# Patient Record
Sex: MALE | Race: WHITE | Employment: OTHER | URBAN - METROPOLITAN AREA
[De-identification: names, ages, dates, MRNs, and addresses within clinical notes are randomized per-mention and may not be internally consistent; named-entity substitution may affect disease eponyms.]

---

## 2017-02-03 ENCOUNTER — APPOINTMENT (OUTPATIENT)
Dept: GENERAL RADIOLOGY | Facility: HOSPITAL | Age: 57
DRG: 247 | End: 2017-02-03
Attending: STUDENT IN AN ORGANIZED HEALTH CARE EDUCATION/TRAINING PROGRAM
Payer: COMMERCIAL

## 2017-02-03 ENCOUNTER — HOSPITAL ENCOUNTER (INPATIENT)
Facility: HOSPITAL | Age: 57
LOS: 3 days | Discharge: HOME | DRG: 247 | End: 2017-02-07
Attending: STUDENT IN AN ORGANIZED HEALTH CARE EDUCATION/TRAINING PROGRAM | Admitting: INTERNAL MEDICINE
Payer: COMMERCIAL

## 2017-02-03 DIAGNOSIS — R07.9 ACUTE CHEST PAIN: Primary | ICD-10-CM

## 2017-02-03 PROBLEM — R73.9 HYPERGLYCEMIA: Status: ACTIVE | Noted: 2017-02-03

## 2017-02-03 PROBLEM — E87.1 HYPONATREMIA: Status: ACTIVE | Noted: 2017-02-03

## 2017-02-03 LAB
ALBUMIN SERPL-MCNC: 4.2 G/DL (ref 3.5–4.8)
ALP LIVER SERPL-CCNC: 84 U/L (ref 45–117)
ALT SERPL-CCNC: 58 U/L (ref 17–63)
APTT PPP: 28.1 SECONDS (ref 25–34)
AST SERPL-CCNC: 28 U/L (ref 15–41)
BASOPHILS # BLD AUTO: 0.06 X10(3) UL (ref 0–0.1)
BASOPHILS NFR BLD AUTO: 0.6 %
BILIRUB SERPL-MCNC: 0.5 MG/DL (ref 0.1–2)
BUN BLD-MCNC: 15 MG/DL (ref 8–20)
CALCIUM BLD-MCNC: 9.1 MG/DL (ref 8.3–10.3)
CHLORIDE: 101 MMOL/L (ref 101–111)
CO2: 23 MMOL/L (ref 22–32)
CREAT BLD-MCNC: 1.05 MG/DL (ref 0.7–1.3)
EOSINOPHIL # BLD AUTO: 0.15 X10(3) UL (ref 0–0.3)
EOSINOPHIL NFR BLD AUTO: 1.5 %
ERYTHROCYTE [DISTWIDTH] IN BLOOD BY AUTOMATED COUNT: 12.5 % (ref 11.5–16)
GLUCOSE BLD-MCNC: 217 MG/DL (ref 70–99)
HCT VFR BLD AUTO: 46.3 % (ref 37–53)
HGB BLD-MCNC: 16.5 G/DL (ref 13–17)
IMMATURE GRANULOCYTE COUNT: 0.05 X10(3) UL (ref 0–1)
IMMATURE GRANULOCYTE RATIO %: 0.5 %
INR BLD: 1.06 (ref 0.89–1.12)
LYMPHOCYTES # BLD AUTO: 3.48 X10(3) UL (ref 0.9–4)
LYMPHOCYTES NFR BLD AUTO: 34.6 %
M PROTEIN MFR SERPL ELPH: 7.5 G/DL (ref 6.1–8.3)
MCH RBC QN AUTO: 32.4 PG (ref 27–33.2)
MCHC RBC AUTO-ENTMCNC: 35.6 G/DL (ref 31–37)
MCV RBC AUTO: 90.8 FL (ref 80–99)
MONOCYTES # BLD AUTO: 0.84 X10(3) UL (ref 0.1–0.6)
MONOCYTES NFR BLD AUTO: 8.3 %
NEUTROPHIL ABS PRELIM: 5.48 X10 (3) UL (ref 1.3–6.7)
NEUTROPHILS # BLD AUTO: 5.48 X10(3) UL (ref 1.3–6.7)
NEUTROPHILS NFR BLD AUTO: 54.5 %
PLATELET # BLD AUTO: 200 10(3)UL (ref 150–450)
POTASSIUM SERPL-SCNC: 3.9 MMOL/L (ref 3.6–5.1)
PRO-BETA NATRIURETIC PEPTIDE: 49 PG/ML (ref ?–125)
PSA SERPL DL<=0.01 NG/ML-MCNC: 14.1 SECONDS (ref 12.3–14.8)
RBC # BLD AUTO: 5.1 X10(6)UL (ref 4.3–5.7)
RED CELL DISTRIBUTION WIDTH-SD: 41.4 FL (ref 35.1–46.3)
SODIUM SERPL-SCNC: 134 MMOL/L (ref 136–144)
TROPONIN: <0.046 NG/ML (ref ?–0.05)
WBC # BLD AUTO: 10.1 X10(3) UL (ref 4–13)

## 2017-02-03 PROCEDURE — 71010 XR CHEST AP PORTABLE  (CPT=71010): CPT

## 2017-02-03 RX ORDER — HEPARIN SODIUM 5000 [USP'U]/ML
5000 INJECTION INTRAVENOUS; SUBCUTANEOUS ONCE
Status: COMPLETED | OUTPATIENT
Start: 2017-02-03 | End: 2017-02-04

## 2017-02-03 RX ORDER — HEPARIN SODIUM AND DEXTROSE 10000; 5 [USP'U]/100ML; G/100ML
1000 INJECTION INTRAVENOUS ONCE
Status: COMPLETED | OUTPATIENT
Start: 2017-02-03 | End: 2017-02-04

## 2017-02-03 RX ORDER — NITROGLYCERIN 0.4 MG/1
TABLET SUBLINGUAL
Status: COMPLETED
Start: 2017-02-03 | End: 2017-02-03

## 2017-02-03 RX ORDER — NITROGLYCERIN 0.4 MG/1
0.4 TABLET SUBLINGUAL EVERY 5 MIN PRN
Status: DISCONTINUED | OUTPATIENT
Start: 2017-02-03 | End: 2017-02-04

## 2017-02-04 ENCOUNTER — APPOINTMENT (OUTPATIENT)
Dept: CV DIAGNOSTICS | Facility: HOSPITAL | Age: 57
DRG: 247 | End: 2017-02-04
Attending: INTERNAL MEDICINE
Payer: COMMERCIAL

## 2017-02-04 ENCOUNTER — APPOINTMENT (OUTPATIENT)
Dept: CT IMAGING | Facility: HOSPITAL | Age: 57
DRG: 247 | End: 2017-02-04
Attending: STUDENT IN AN ORGANIZED HEALTH CARE EDUCATION/TRAINING PROGRAM
Payer: COMMERCIAL

## 2017-02-04 PROBLEM — I20.0 UNSTABLE ANGINA (HCC): Status: ACTIVE | Noted: 2017-02-04

## 2017-02-04 LAB
APTT PPP: 56.1 SECONDS (ref 25–34)
ATRIAL RATE: 78 BPM
BASOPHILS # BLD AUTO: 0.05 X10(3) UL (ref 0–0.1)
BASOPHILS NFR BLD AUTO: 0.4 %
BUN BLD-MCNC: 15 MG/DL (ref 8–20)
CALCIUM BLD-MCNC: 8.3 MG/DL (ref 8.3–10.3)
CHLORIDE: 104 MMOL/L (ref 101–111)
CHOLEST SMN-MCNC: 132 MG/DL (ref ?–200)
CO2: 27 MMOL/L (ref 22–32)
CREAT BLD-MCNC: 1.05 MG/DL (ref 0.7–1.3)
EOSINOPHIL # BLD AUTO: 0.12 X10(3) UL (ref 0–0.3)
EOSINOPHIL NFR BLD AUTO: 1 %
ERYTHROCYTE [DISTWIDTH] IN BLOOD BY AUTOMATED COUNT: 12.6 % (ref 11.5–16)
EST. AVERAGE GLUCOSE BLD GHB EST-MCNC: 220 MG/DL (ref 68–126)
GLUCOSE BLD-MCNC: 153 MG/DL (ref 65–99)
GLUCOSE BLD-MCNC: 154 MG/DL (ref 65–99)
GLUCOSE BLD-MCNC: 198 MG/DL (ref 65–99)
GLUCOSE BLD-MCNC: 206 MG/DL (ref 65–99)
GLUCOSE BLD-MCNC: 218 MG/DL (ref 70–99)
HAV IGM SER QL: 2.2 MG/DL (ref 1.7–3)
HBA1C MFR BLD HPLC: 9.3 % (ref ?–5.7)
HCT VFR BLD AUTO: 42 % (ref 37–53)
HDLC SERPL-MCNC: 42 MG/DL (ref 45–?)
HDLC SERPL: 3.14 {RATIO} (ref ?–4.97)
HGB BLD-MCNC: 14.8 G/DL (ref 13–17)
IMMATURE GRANULOCYTE COUNT: 0.05 X10(3) UL (ref 0–1)
IMMATURE GRANULOCYTE RATIO %: 0.4 %
INR BLD: 1.12 (ref 0.89–1.12)
LDLC SERPL CALC-MCNC: 65 MG/DL (ref ?–130)
LYMPHOCYTES # BLD AUTO: 2.3 X10(3) UL (ref 0.9–4)
LYMPHOCYTES NFR BLD AUTO: 19.7 %
MCH RBC QN AUTO: 32.3 PG (ref 27–33.2)
MCHC RBC AUTO-ENTMCNC: 35.2 G/DL (ref 31–37)
MCV RBC AUTO: 91.7 FL (ref 80–99)
MONOCYTES # BLD AUTO: 0.83 X10(3) UL (ref 0.1–0.6)
MONOCYTES NFR BLD AUTO: 7.1 %
NEUTROPHIL ABS PRELIM: 8.3 X10 (3) UL (ref 1.3–6.7)
NEUTROPHILS # BLD AUTO: 8.3 X10(3) UL (ref 1.3–6.7)
NEUTROPHILS NFR BLD AUTO: 71.4 %
NONHDLC SERPL-MCNC: 90 MG/DL (ref ?–130)
P AXIS: 60 DEGREES
P-R INTERVAL: 154 MS
PLATELET # BLD AUTO: 158 10(3)UL (ref 150–450)
POTASSIUM SERPL-SCNC: 3.9 MMOL/L (ref 3.6–5.1)
PSA SERPL DL<=0.01 NG/ML-MCNC: 14.8 SECONDS (ref 12.3–14.8)
Q-T INTERVAL: 396 MS
QRS DURATION: 92 MS
QTC CALCULATION (BEZET): 451 MS
R AXIS: 24 DEGREES
RBC # BLD AUTO: 4.58 X10(6)UL (ref 4.3–5.7)
RED CELL DISTRIBUTION WIDTH-SD: 41.9 FL (ref 35.1–46.3)
SODIUM SERPL-SCNC: 137 MMOL/L (ref 136–144)
T AXIS: 79 DEGREES
TRIGLYCERIDES: 127 MG/DL (ref ?–150)
TROPONIN: <0.046 NG/ML (ref ?–0.05)
VENTRICULAR RATE: 78 BPM
VLDL: 25 MG/DL (ref 5–40)
WBC # BLD AUTO: 11.7 X10(3) UL (ref 4–13)

## 2017-02-04 PROCEDURE — 93306 TTE W/DOPPLER COMPLETE: CPT

## 2017-02-04 PROCEDURE — 93306 TTE W/DOPPLER COMPLETE: CPT | Performed by: INTERNAL MEDICINE

## 2017-02-04 PROCEDURE — 99220 INITIAL OBSERVATION CARE,LEVL III: CPT | Performed by: INTERNAL MEDICINE

## 2017-02-04 PROCEDURE — 71275 CT ANGIOGRAPHY CHEST: CPT

## 2017-02-04 RX ORDER — WARFARIN SODIUM 5 MG/1
5 TABLET ORAL NIGHTLY
Status: DISCONTINUED | OUTPATIENT
Start: 2017-02-04 | End: 2017-02-04

## 2017-02-04 RX ORDER — MORPHINE SULFATE 4 MG/ML
4 INJECTION, SOLUTION INTRAMUSCULAR; INTRAVENOUS EVERY 2 HOUR PRN
Status: DISCONTINUED | OUTPATIENT
Start: 2017-02-04 | End: 2017-02-07

## 2017-02-04 RX ORDER — ASPIRIN 325 MG
325 TABLET ORAL DAILY
Status: DISCONTINUED | OUTPATIENT
Start: 2017-02-04 | End: 2017-02-07

## 2017-02-04 RX ORDER — SERTRALINE HYDROCHLORIDE 100 MG/1
100 TABLET, FILM COATED ORAL DAILY
Status: DISCONTINUED | OUTPATIENT
Start: 2017-02-04 | End: 2017-02-07

## 2017-02-04 RX ORDER — HEPARIN SODIUM AND DEXTROSE 10000; 5 [USP'U]/100ML; G/100ML
INJECTION INTRAVENOUS CONTINUOUS
Status: DISCONTINUED | OUTPATIENT
Start: 2017-02-05 | End: 2017-02-04

## 2017-02-04 RX ORDER — DEXTROSE MONOHYDRATE 25 G/50ML
50 INJECTION, SOLUTION INTRAVENOUS
Status: DISCONTINUED | OUTPATIENT
Start: 2017-02-04 | End: 2017-02-07

## 2017-02-04 RX ORDER — NITROGLYCERIN 0.4 MG/1
0.4 TABLET SUBLINGUAL EVERY 5 MIN PRN
Status: DISCONTINUED | OUTPATIENT
Start: 2017-02-04 | End: 2017-02-07

## 2017-02-04 RX ORDER — METOPROLOL SUCCINATE 25 MG/1
25 TABLET, EXTENDED RELEASE ORAL
Status: DISCONTINUED | OUTPATIENT
Start: 2017-02-04 | End: 2017-02-07

## 2017-02-04 RX ORDER — MORPHINE SULFATE 2 MG/ML
2 INJECTION, SOLUTION INTRAMUSCULAR; INTRAVENOUS EVERY 2 HOUR PRN
Status: DISCONTINUED | OUTPATIENT
Start: 2017-02-04 | End: 2017-02-07

## 2017-02-04 RX ORDER — ASPIRIN 81 MG/1
81 TABLET, CHEWABLE ORAL DAILY
Status: DISCONTINUED | OUTPATIENT
Start: 2017-02-04 | End: 2017-02-04

## 2017-02-04 RX ORDER — HEPARIN SODIUM AND DEXTROSE 10000; 5 [USP'U]/100ML; G/100ML
1000 INJECTION INTRAVENOUS CONTINUOUS
Status: DISCONTINUED | OUTPATIENT
Start: 2017-02-04 | End: 2017-02-05

## 2017-02-04 RX ORDER — ATORVASTATIN CALCIUM 40 MG/1
40 TABLET, FILM COATED ORAL NIGHTLY
Status: DISCONTINUED | OUTPATIENT
Start: 2017-02-04 | End: 2017-02-07

## 2017-02-04 RX ORDER — MORPHINE SULFATE 2 MG/ML
1 INJECTION, SOLUTION INTRAMUSCULAR; INTRAVENOUS EVERY 2 HOUR PRN
Status: DISCONTINUED | OUTPATIENT
Start: 2017-02-04 | End: 2017-02-07

## 2017-02-04 RX ORDER — HEPARIN SODIUM AND DEXTROSE 10000; 5 [USP'U]/100ML; G/100ML
1000 INJECTION INTRAVENOUS CONTINUOUS
Status: DISCONTINUED | OUTPATIENT
Start: 2017-02-04 | End: 2017-02-04

## 2017-02-04 NOTE — PROGRESS NOTES
Received patient from ED 0100, alert and oriented X 4, spouse at bedside, no c/o CP/SOB, SpO2 97 % on 4L/min via NC  Rhythm/HR: NSR 70's  Heparin gtt initiated in ED at 1000 units/hr (10ml/hr) around 0000  0600- PTT 56.1- therapeutic, lab to be drawn in th

## 2017-02-04 NOTE — ED PROVIDER NOTES
Patient Seen in: [facility] Emergency Department    History   Patient presents with:  Chest Pain Angina (cardiovascular)    Stated Complaint: chest pain    HPI    Patient is a 43-year-old male who presents emergency department complaining of tightne op shoe    Dx: toe fracture x 3 right foot.        Family History   Problem Relation Age of Onset   • Heart Disease Father    • Heart Disorder Father    • Heart Disease Sister    • Cancer Sister    • Stroke Sister    • Heart Disorder Sister    • Diabetes Si Normal range of motion, no edema. Neurological: alert and oriented to person, place, and time. Skin: Skin is warm and dry. No rash noted. Psychiatric: normal mood and affect.        ED Course     Labs Reviewed   COMP METABOLIC PANEL (14) - Abnormal; N Patient's pain resolved with nitroglycerin and did not recur in the ER. Interim Nitropaste was applied to the patient's chest wall.     Patient remained hemodynamically stable throughout their emergency department period of observation with no adverse

## 2017-02-04 NOTE — PAYOR COMM NOTE
Attending Physician: Neo Kovacs MD    Review Type: ADMISSION   Brenna Lieberman       Date: February 4, 2017 - 2:18 PM  [id number]: N[facility]A  Admit date: 2/3/2017 10:10 PM   Admitted from Emergency Dept. Oral Renny Prasad RN      Heparin Sodium (Porcine) 5000 UNIT/ML injection 5,000 Units     Date Action Dose Route User    2/4/2017 0010 Given 5000 Units Intravenous Jacobo Walsh RN      heparin (PORCINE) drip 34145woqsf/250mL infusion CONTINUOUS Notable for the following:     POC Glucose 154 (*)     All other components within normal limits   POCT GLUCOSE - Abnormal; Notable for the following:     POC Glucose 153 (*)     All other components within normal limits   CBC W/ DIFFERENTIAL - Abnormal; N

## 2017-02-04 NOTE — H&P
[facility]                                                               History & Physical         Deric Vail Patient Status:  Emergency    1960 MRN [id number]   Location [address] Attending Nahomi Christine Disease Brother    • Stroke Brother    • Heart Disorder Brother    • Heart Disorder Mother       Reviewed    Social history:   reports that he has been smoking. He does not have any smokeless tobacco history on file. He reports that he drinks alcohol.  He <0.046 02/03/2017       Recent Labs   Lab  02/03/17   2216   PTP  14.1   INR  1.06       Recent Labs   Lab  02/03/17   2216   TROP  <0.046     Imaging:    CT ANGIOGRAM OF THE CHEST      COMPARISON:  [place] CTA CHEST, 8/10/2012, 14:27.  [place] , BRAIN W/O steatosis. BONES:  Mild degenerative disc disease.      =====  CONCLUSION:     #1. No acute pulmonary embolus to the segmental level. #2. Emphysematous changes. #3. Pulmonary nodules described above.  The majority of these nodules are stable since 8/10/1 according to patient      Quality:  · DVT Prophylaxis: Patient on heparin drip  · CODE status: Full  · Acosta: None    Plan of care discussed with patient and patient's wife at bedside      Discussed with ER Physician.   Discussed in detail with patient and

## 2017-02-04 NOTE — ED INITIAL ASSESSMENT (HPI)
For the past hour, pt has complained of a tightness to the center of his chest. He has one cardiac stent placed. Medics gave him 4 baby asa and 2 nitro sprays.

## 2017-02-04 NOTE — ED PROVIDER NOTES
Radiology called with CT report of nodule is slightly increased in size and the patient should have a repeat CT scan in 6 months to rule out any concerning change in size for this nodule.   I did relay this information to the primary nurse on the floor wher

## 2017-02-04 NOTE — PROGRESS NOTES
02/04/17 0145 02/04/17 0148 02/04/17 0151   Vital Signs   Respiratory Quality Normal Normal Normal   /70 mmHg 123/64 mmHg 113/83 mmHg   BP Location Left arm Left arm Left arm   BP Method Automatic Automatic Automatic   Patient Position Lying Sitti

## 2017-02-04 NOTE — CONSULTS
[facility] Cardiology Consultation    Jyotsna Schmidt Patient Status:  Observation    1960 MRN [id number]   [facility] 2NE-A Attending Gloria Ocampo MD   Hosp Day # 1 PCP Juwan Fragoso MD     Reason for Consultation:  Chest pain, known Disorder Mother          Allergies:    Penicillins                 Comment:Was told as a child that is allergic to Penicillin    Medications:  • aspirin  325 mg Oral Daily   • insulin aspart  1-10 Units Subcutaneous TID CC   • Metoprolol Succinate ER  25 m Recent Labs   Lab  02/03/17   2216  02/04/17   0731   PTP  14.1  14.8   INR  1.06  1.12       Recent Labs   Lab  02/03/17 2216  02/04/17   0453   TROP  <0.046  <0.046         Impression:   1.  Chest pain, dyspnea, palpitations - pain resolved with N

## 2017-02-05 LAB
APTT PPP: 42.5 SECONDS (ref 25–34)
GLUCOSE BLD-MCNC: 138 MG/DL (ref 65–99)
GLUCOSE BLD-MCNC: 146 MG/DL (ref 65–99)
GLUCOSE BLD-MCNC: 147 MG/DL (ref 65–99)
GLUCOSE BLD-MCNC: 183 MG/DL (ref 65–99)
TROPONIN: <0.046 NG/ML (ref ?–0.05)

## 2017-02-05 PROCEDURE — 99225 SUBSEQUENT OBSERVATION CARE: CPT | Performed by: HOSPITALIST

## 2017-02-05 RX ORDER — SODIUM CHLORIDE 9 MG/ML
INJECTION, SOLUTION INTRAVENOUS CONTINUOUS
Status: DISCONTINUED | OUTPATIENT
Start: 2017-02-06 | End: 2017-02-06

## 2017-02-05 RX ORDER — ASPIRIN 81 MG/1
324 TABLET, CHEWABLE ORAL ONCE
Status: COMPLETED | OUTPATIENT
Start: 2017-02-06 | End: 2017-02-06

## 2017-02-05 RX ORDER — ENOXAPARIN SODIUM 150 MG/ML
1 INJECTION SUBCUTANEOUS ONCE
Status: COMPLETED | OUTPATIENT
Start: 2017-02-05 | End: 2017-02-05

## 2017-02-05 RX ORDER — SODIUM CHLORIDE 9 MG/ML
INJECTION, SOLUTION INTRAVENOUS CONTINUOUS
Status: DISCONTINUED | OUTPATIENT
Start: 2017-02-05 | End: 2017-02-05

## 2017-02-05 NOTE — PLAN OF CARE
Diabetes/Glucose Control    • Glucose maintained within prescribed range Progressing        PAIN - ADULT    • Verbalizes/displays adequate comfort level or patient's stated pain goal Progressing        Patient/Family Goals    • Patient/Family [facility]

## 2017-02-05 NOTE — PROGRESS NOTES
[facility]  Progress Note     Tiffany Perez Patient Status:  Observation    1960 MRN [id number]   [facility] 2NE-A Attending Kingsley Lewis MD   Hosp Day # 2 PCP Lise Lerma MD     Chief Complaint: Chest pain    S: Wolm Mantis Sertraline HCl  100 mg Oral Daily       ASSESSMENT / PLAN:     1. Chest pain, troponin negative  2. History of CAD sp PCI  3. Essential hypertension  4. Dyslipidemia, LDL goal < 70  1. Aspirin  2. Toprol  3. Lipitor  4. Heparin drip > Lovenox x 1 today  5.

## 2017-02-05 NOTE — PROGRESS NOTES
[facility] Cardiology Progress Note        Daryle Alamo Patient Status:  Observation    1960 MRN [id number]   [facility] 2NE-A Attending Bee López MD   Hosp Day # 2 PCP Katie Allison MD     Subjective 02/03/17 2216   ALT  58   AST  28   ALB  4.2       Recent Labs   Lab  02/03/17 2216 02/04/17   0453  02/05/17   0555   TROP  <0.046  <0.046  <0.046       Recent Labs   Lab  02/03/17 2216 02/04/17   0731   PTP  14.1  14.8   INR  1.06  1.12

## 2017-02-05 NOTE — PLAN OF CARE
Problem: Diabetes/Glucose Control  Goal: Glucose maintained within prescribed range  INTERVENTIONS:  - Monitor Blood Glucose as ordered  - Assess for signs and symptoms of hyperglycemia and hypoglycemia  - Administer ordered medications to maintain glucose injury  INTERVENTIONS:  - Assess pt frequently for physical needs  - Identify cognitive and physical deficits and behaviors that affect risk of falls.   - [place] fall precautions as indicated by assessment.  - Educate pt/family on patient safety includin

## 2017-02-06 ENCOUNTER — APPOINTMENT (OUTPATIENT)
Dept: INTERVENTIONAL RADIOLOGY/VASCULAR | Facility: HOSPITAL | Age: 57
DRG: 247 | End: 2017-02-06
Attending: INTERNAL MEDICINE
Payer: COMMERCIAL

## 2017-02-06 LAB
APTT PPP: 32.1 SECONDS (ref 25–34)
BASOPHILS # BLD AUTO: 0.04 X10(3) UL (ref 0–0.1)
BASOPHILS NFR BLD AUTO: 0.5 %
BUN BLD-MCNC: 19 MG/DL (ref 8–20)
CALCIUM BLD-MCNC: 8.5 MG/DL (ref 8.3–10.3)
CHLORIDE: 108 MMOL/L (ref 101–111)
CO2: 24 MMOL/L (ref 22–32)
CREAT BLD-MCNC: 0.83 MG/DL (ref 0.7–1.3)
EOSINOPHIL # BLD AUTO: 0.15 X10(3) UL (ref 0–0.3)
EOSINOPHIL NFR BLD AUTO: 1.9 %
ERYTHROCYTE [DISTWIDTH] IN BLOOD BY AUTOMATED COUNT: 12.4 % (ref 11.5–16)
GLUCOSE BLD-MCNC: 123 MG/DL (ref 65–99)
GLUCOSE BLD-MCNC: 137 MG/DL (ref 65–99)
GLUCOSE BLD-MCNC: 140 MG/DL (ref 65–99)
GLUCOSE BLD-MCNC: 154 MG/DL (ref 70–99)
GLUCOSE BLD-MCNC: 242 MG/DL (ref 65–99)
HCT VFR BLD AUTO: 46.4 % (ref 37–53)
HGB BLD-MCNC: 16.1 G/DL (ref 13–17)
IMMATURE GRANULOCYTE COUNT: 0.07 X10(3) UL (ref 0–1)
IMMATURE GRANULOCYTE RATIO %: 0.9 %
INR BLD: 1.01 (ref 0.89–1.12)
LYMPHOCYTES # BLD AUTO: 2.38 X10(3) UL (ref 0.9–4)
LYMPHOCYTES NFR BLD AUTO: 30.6 %
MCH RBC QN AUTO: 32 PG (ref 27–33.2)
MCHC RBC AUTO-ENTMCNC: 34.7 G/DL (ref 31–37)
MCV RBC AUTO: 92.2 FL (ref 80–99)
MONOCYTES # BLD AUTO: 0.72 X10(3) UL (ref 0.1–0.6)
MONOCYTES NFR BLD AUTO: 9.3 %
NEUTROPHIL ABS PRELIM: 4.42 X10 (3) UL (ref 1.3–6.7)
NEUTROPHILS # BLD AUTO: 4.42 X10(3) UL (ref 1.3–6.7)
NEUTROPHILS NFR BLD AUTO: 56.8 %
PLATELET # BLD AUTO: 153 10(3)UL (ref 150–450)
POTASSIUM SERPL-SCNC: 4.2 MMOL/L (ref 3.6–5.1)
PSA SERPL DL<=0.01 NG/ML-MCNC: 13.6 SECONDS (ref 12.3–14.8)
RBC # BLD AUTO: 5.03 X10(6)UL (ref 4.3–5.7)
RED CELL DISTRIBUTION WIDTH-SD: 42 FL (ref 35.1–46.3)
SODIUM SERPL-SCNC: 140 MMOL/L (ref 136–144)
WBC # BLD AUTO: 7.8 X10(3) UL (ref 4–13)

## 2017-02-06 PROCEDURE — B2151ZZ FLUOROSCOPY OF LEFT HEART USING LOW OSMOLAR CONTRAST: ICD-10-PCS | Performed by: INTERNAL MEDICINE

## 2017-02-06 PROCEDURE — 4A023N7 MEASUREMENT OF CARDIAC SAMPLING AND PRESSURE, LEFT HEART, PERCUTANEOUS APPROACH: ICD-10-PCS | Performed by: INTERNAL MEDICINE

## 2017-02-06 PROCEDURE — 027035Z DILATION OF CORONARY ARTERY, ONE ARTERY WITH TWO DRUG-ELUTING INTRALUMINAL DEVICES, PERCUTANEOUS APPROACH: ICD-10-PCS | Performed by: INTERNAL MEDICINE

## 2017-02-06 PROCEDURE — B2111ZZ FLUOROSCOPY OF MULTIPLE CORONARY ARTERIES USING LOW OSMOLAR CONTRAST: ICD-10-PCS | Performed by: INTERNAL MEDICINE

## 2017-02-06 RX ORDER — HEPARIN SODIUM 5000 [USP'U]/ML
INJECTION, SOLUTION INTRAVENOUS; SUBCUTANEOUS
Status: COMPLETED
Start: 2017-02-06 | End: 2017-02-06

## 2017-02-06 RX ORDER — MIDAZOLAM HYDROCHLORIDE 1 MG/ML
INJECTION INTRAMUSCULAR; INTRAVENOUS
Status: COMPLETED
Start: 2017-02-06 | End: 2017-02-06

## 2017-02-06 RX ORDER — LIDOCAINE HYDROCHLORIDE 10 MG/ML
INJECTION, SOLUTION INFILTRATION; PERINEURAL
Status: COMPLETED
Start: 2017-02-06 | End: 2017-02-06

## 2017-02-06 RX ORDER — PRASUGREL 10 MG/1
TABLET, FILM COATED ORAL
Status: COMPLETED
Start: 2017-02-06 | End: 2017-02-06

## 2017-02-06 NOTE — BRIEF PROCEDURE NOTE
Interventional cardiology procedure note:    80% and 70% stenosis in the circumflex system distal stenosis is probably in-stent restenosis    PTCA of both lesions with 2.5 x 15 balloon  Stenting of both lesions with 3.0 x 16 Promus drug-eluting stents    F

## 2017-02-06 NOTE — PROCEDURES
[address]    PATIENT'S NAME: Nathaniel De Leonronaldo SHAFFER   ATTENDING PHYSICIAN: Gurpreet Scott M.D. OPERATING PHYSICIAN: Phyllis Portillo M.D.    PATIENT ACCOUNT#:   [de-identified]    LOCATION:  [address] [place] [address]  MEDICAL RECORD #:   [id number]       DATE OF BI guidewire and positioned over the more proximal lesion. The stent was also deployed at 14 atmospheres for about 35 seconds with good balloon expansion. Post stent deployment angiography now showed essentially 0% residual stenosis.   There was some concern

## 2017-02-06 NOTE — PLAN OF CARE
Patient to go for cath tomorrow. Denies pain at this time. Pre and post op procedures for angiogram discussed with patient. Understanding expressed.

## 2017-02-06 NOTE — PLAN OF CARE
Pt admit w/ CP, to have cath today. Pt A&O, denies cp. VSS. On RA. NSR. Had cath through R fem w/ Dr. Derrick Hanks- 2 DAVID to circ. Pt ambulates ad aldo w/o complication. Pt and wife updated on POC- possible d/c tomorrow. Will monitor.     1700: Pt recove

## 2017-02-06 NOTE — PROCEDURES
[facility]  Coronary Angiogram Procedure Note    [facility] Location: Cath Lab    CSN [id number] MRN [id number]   Admission Date 2/3/2017 Procedure Date 2/6/2017   Attending Physician Dalila Ahumada, MD Procedure Physician Mk Bello MD Findings:    Diffuse coronary calcfications    1. Left Main: 20% distal tapering  2. LAD: one major diagonal with proximal takeoff. Diffuse narrowing with discrete 50% stenosis in the mid portion, otherwise no significant obstructive disease.   3. LCx: 2 v

## 2017-02-07 VITALS
TEMPERATURE: 98 F | SYSTOLIC BLOOD PRESSURE: 130 MMHG | HEIGHT: 70 IN | DIASTOLIC BLOOD PRESSURE: 92 MMHG | BODY MASS INDEX: 34.18 KG/M2 | WEIGHT: 238.75 LBS | OXYGEN SATURATION: 94 % | RESPIRATION RATE: 18 BRPM | HEART RATE: 65 BPM

## 2017-02-07 LAB
ATRIAL RATE: 56 BPM
BASOPHILS # BLD AUTO: 0.03 X10(3) UL (ref 0–0.1)
BASOPHILS NFR BLD AUTO: 0.3 %
BUN BLD-MCNC: 14 MG/DL (ref 8–20)
CALCIUM BLD-MCNC: 8.8 MG/DL (ref 8.3–10.3)
CHLORIDE: 106 MMOL/L (ref 101–111)
CO2: 28 MMOL/L (ref 22–32)
CREAT BLD-MCNC: 1 MG/DL (ref 0.7–1.3)
EOSINOPHIL # BLD AUTO: 0.12 X10(3) UL (ref 0–0.3)
EOSINOPHIL NFR BLD AUTO: 1.4 %
ERYTHROCYTE [DISTWIDTH] IN BLOOD BY AUTOMATED COUNT: 12.6 % (ref 11.5–16)
GLUCOSE BLD-MCNC: 148 MG/DL (ref 65–99)
GLUCOSE BLD-MCNC: 152 MG/DL (ref 65–99)
GLUCOSE BLD-MCNC: 155 MG/DL (ref 70–99)
HCT VFR BLD AUTO: 45.5 % (ref 37–53)
HGB BLD-MCNC: 15.8 G/DL (ref 13–17)
IMMATURE GRANULOCYTE COUNT: 0.09 X10(3) UL (ref 0–1)
IMMATURE GRANULOCYTE RATIO %: 1 %
LYMPHOCYTES # BLD AUTO: 1.84 X10(3) UL (ref 0.9–4)
LYMPHOCYTES NFR BLD AUTO: 21 %
MCH RBC QN AUTO: 31.9 PG (ref 27–33.2)
MCHC RBC AUTO-ENTMCNC: 34.7 G/DL (ref 31–37)
MCV RBC AUTO: 91.9 FL (ref 80–99)
MONOCYTES # BLD AUTO: 0.85 X10(3) UL (ref 0.1–0.6)
MONOCYTES NFR BLD AUTO: 9.7 %
NEUTROPHIL ABS PRELIM: 5.85 X10 (3) UL (ref 1.3–6.7)
NEUTROPHILS # BLD AUTO: 5.85 X10(3) UL (ref 1.3–6.7)
NEUTROPHILS NFR BLD AUTO: 66.6 %
P AXIS: 18 DEGREES
P-R INTERVAL: 158 MS
PLATELET # BLD AUTO: 148 10(3)UL (ref 150–450)
POTASSIUM SERPL-SCNC: 4.2 MMOL/L (ref 3.6–5.1)
Q-T INTERVAL: 470 MS
QRS DURATION: 96 MS
QTC CALCULATION (BEZET): 453 MS
R AXIS: 27 DEGREES
RBC # BLD AUTO: 4.95 X10(6)UL (ref 4.3–5.7)
RED CELL DISTRIBUTION WIDTH-SD: 42.3 FL (ref 35.1–46.3)
SODIUM SERPL-SCNC: 140 MMOL/L (ref 136–144)
T AXIS: 49 DEGREES
VENTRICULAR RATE: 56 BPM
WBC # BLD AUTO: 8.8 X10(3) UL (ref 4–13)

## 2017-02-07 PROCEDURE — 99239 HOSP IP/OBS DSCHRG MGMT >30: CPT | Performed by: HOSPITALIST

## 2017-02-07 RX ORDER — PRASUGREL 10 MG/1
10 TABLET, FILM COATED ORAL DAILY
Qty: 30 TABLET | Refills: 11 | Status: SHIPPED | OUTPATIENT
Start: 2017-02-07 | End: 2021-07-19

## 2017-02-07 RX ORDER — PRASUGREL 10 MG/1
10 TABLET, FILM COATED ORAL DAILY
Status: DISCONTINUED | OUTPATIENT
Start: 2017-02-07 | End: 2017-02-07

## 2017-02-07 RX ORDER — ASPIRIN 325 MG
325 TABLET ORAL DAILY
Qty: 90 TABLET | Refills: 3 | Status: SHIPPED | OUTPATIENT
Start: 2017-02-07 | End: 2021-07-19

## 2017-02-07 RX ORDER — METOPROLOL SUCCINATE 25 MG/1
25 TABLET, EXTENDED RELEASE ORAL
Qty: 30 TABLET | Refills: 3 | Status: SHIPPED | OUTPATIENT
Start: 2017-02-07

## 2017-02-07 NOTE — PROGRESS NOTES
[facility] Cardiology Progress Note        Kristi Garcia Patient Status:  Observation    1960 MRN [id number]   [facility] 2NE-A Attending Jc White MD   Hosp Day # 4 PCP Santos Adhikari MD     Subjective ALT, AST, ALB, AMYLASE, LIPASE, LDH in the last 72 hours. Invalid input(s): ALPHOS, TBIL, DBIL, TPROT    Recent Labs   Lab  02/05/17   0555   TROP  <0.046       Recent Labs   Lab  02/06/17   0558   PTP  13.6   INR  1.01                 Impression:  1.  C

## 2017-02-07 NOTE — CONSULTS
Hematology/Oncology Consult    Cc: PE, APLA    HPI:  47-year-old man was last seen in my office in 12/12 for PE's diagnosed in 8/12. He presented to the ED in 8/12 with right-sided pleuritic chest pain.   CT chest on 8/10/12 showed right-sided thrombus inv significant inferior wall MI with circumflex occlusion and a drug eluting stent placed   • Long term current use of anticoagulant    • Pulmonary embolism (HCC)    • Coronary artery disease    • Hyperlipidemia    • Hypercholesterolemia    • Diabetes mellitu Special: Cannabis       Comment: last use tonight, uses daily      Family History   Problem Relation Age of Onset   • Heart Disease Father    • Heart Disorder Father    • Heart Disease Sister    • Cancer Sister    • Stroke Sister    • Heart Diso However, prasugrel and aspirin are antiplatelet agents that protect against arterial thromboses, whereas coumadin and like medications protect mainly against venous thromboses. The clotting mechanisms are slightly different.   If his APLA labs are still ab

## 2017-02-07 NOTE — PROGRESS NOTES
[facility]  Progress Note     [facility] Patient Status:  Observation    1960 MRN [id number]   [facility] 2NE-A Attending Elisa Anthony MD   Hosp Day # 4 PCP Meggan Mauro MD     Chief Complaint: Chest pain    S: Jonatan Roberts to start 2/9  2. Accuechecks  6. History of PE - last noted to be on Coumadin in 2015 - no records since, VTE in 2012, isolated, no recurrence  1. Doubt needs anticoagulation, hematology to weigh in  7. Lung nodules, discussed with patient  1.  Outpatient f

## 2017-02-07 NOTE — PROGRESS NOTES
NURSING DISCHARGE NOTE      Pt discharged to home w/ wife in stable condition, cleared by all consults. Cath and diabetes discharge instructions provided and reviewed w/ pt.   Prescriptions, medication list, and follow-up appointments provided and re

## 2017-02-07 NOTE — PLAN OF CARE
Pt s/p cath yesterday w/ 2 DAVID to circumflex. Pt A&O, denies pain. Wants to go home. VSS. On RA.  NSR/SB. Effient started today. R groin cath site C&D, soft, no hematoma noted. Pt educated on DM and smoking cessation- verbalized understanding.   Pt an

## 2017-02-07 NOTE — PLAN OF CARE
A&ox4, s/p LHC w/ 2 DAVID to circ  R groin angioseal, c/d/i, dressing in place, soft, no hematoma  Room air, NSR on tele  Voids  Up ad aldo  Plan to d/c in am, with effient  Will continue to monitor overnight    CARDIOVASCULAR - ADULT    • Maintains optimal c

## 2017-02-07 NOTE — PAYOR COMM NOTE
Attending Physician: Prudencio Buerger, MD    2/6      Pre-Procedure Diagnosis: unstable angina    Post-Procedure Diagnosis: High grade single vessel CAD    Procedure Performed: CA, LV, LHC, angioseal    Summary of Case: After written informed consent was obt

## 2017-02-08 NOTE — DISCHARGE SUMMARY
[facility] HOSPITALIST  DISCHARGE SUMMARY     Albert Apodaca Patient Status:  Inpatient    1960 MRN [id number]   [facility] 2NE-A Attending Gopi Sommers MD   [facility] Day # 4 PCP Enedina Stallworth MD     Date of Admission: 2/3/2017  Date o with lung nodules, most stable and benign but one with change. Patient requires CT in 6 months. Lastly, patient with history of PE with APLA. Patient to follow-up with hematologist as outpatient to evaluate need for anticoagulation. Home today.     1. CAD s 2/6/2017 10:00 PM   Commonly known as:  LIPITOR        TAKE ONE TABLET BY MOUTH AT BEDTIME    Quantity:  90 tablet   Refills:  1       NON FORMULARY        Post op shoe  Dx: toe fracture x 3 right foot.     Quantity:  1 Device   Refills:  0       Sertraline

## 2017-02-09 LAB — ISTAT ACTIVATED CLOTTING TIME: 657 SECONDS (ref 74–137)

## 2017-02-10 LAB
BETA 2 GLYCOPROTEIN 1 AB, IGG: <3.7 U/ML (ref ?–15)
BETA 2 GLYCOPROTEIN 1 AB, IGM: 102.6 U/ML (ref ?–15)
DRVVT LUPUS ANTICOAGULANT: NEGATIVE
DRVVT SCREEN RATIO: 1 (ref 0–1.29)
PHOSPHOLIPID AB, IGG: NEGATIVE
STACLOT LA DELTA: 10 SECONDS (ref ?–8)
STACLOT LA: POSITIVE

## 2017-02-28 ENCOUNTER — PRIOR ORIGINAL RECORDS (OUTPATIENT)
Dept: OTHER | Age: 57
End: 2017-02-28

## 2017-03-28 ENCOUNTER — PRIOR ORIGINAL RECORDS (OUTPATIENT)
Dept: OTHER | Age: 57
End: 2017-03-28

## 2017-04-06 ENCOUNTER — PRIOR ORIGINAL RECORDS (OUTPATIENT)
Dept: OTHER | Age: 57
End: 2017-04-06

## 2017-04-17 ENCOUNTER — PRIOR ORIGINAL RECORDS (OUTPATIENT)
Dept: OTHER | Age: 57
End: 2017-04-17

## 2017-07-15 ENCOUNTER — HOSPITAL ENCOUNTER (EMERGENCY)
Facility: HOSPITAL | Age: 57
Discharge: HOME | End: 2017-07-15
Attending: EMERGENCY MEDICINE
Payer: COMMERCIAL

## 2017-07-15 ENCOUNTER — APPOINTMENT (OUTPATIENT)
Dept: CT IMAGING | Facility: HOSPITAL | Age: 57
End: 2017-07-15
Attending: EMERGENCY MEDICINE
Payer: COMMERCIAL

## 2017-07-15 VITALS
DIASTOLIC BLOOD PRESSURE: 82 MMHG | RESPIRATION RATE: 17 BRPM | WEIGHT: 230 LBS | BODY MASS INDEX: 32.2 KG/M2 | SYSTOLIC BLOOD PRESSURE: 129 MMHG | HEIGHT: 71 IN | TEMPERATURE: 98 F | OXYGEN SATURATION: 95 % | HEART RATE: 62 BPM

## 2017-07-15 DIAGNOSIS — R51.9 NONINTRACTABLE HEADACHE, UNSPECIFIED CHRONICITY PATTERN, UNSPECIFIED HEADACHE TYPE: Primary | ICD-10-CM

## 2017-07-15 LAB
INR BLD: 2.46 (ref 0.89–1.11)
PSA SERPL DL<=0.01 NG/ML-MCNC: 27.1 SECONDS (ref 12–14.3)

## 2017-07-15 PROCEDURE — 99284 EMERGENCY DEPT VISIT MOD MDM: CPT

## 2017-07-15 PROCEDURE — 96360 HYDRATION IV INFUSION INIT: CPT

## 2017-07-15 PROCEDURE — 70450 CT HEAD/BRAIN W/O DYE: CPT | Performed by: EMERGENCY MEDICINE

## 2017-07-15 PROCEDURE — 85610 PROTHROMBIN TIME: CPT | Performed by: EMERGENCY MEDICINE

## 2017-07-15 RX ORDER — WARFARIN SODIUM 5 MG/1
5 TABLET ORAL NIGHTLY
COMMUNITY

## 2017-07-15 NOTE — ED PROVIDER NOTES
Patient Seen in: [facility] Emergency Department    History   Patient presents with:  Headache (neurologic)    Stated Complaint: HA    HPI    Patient is a very pleasant gentleman 60years old who presents with a headache.   Patient was driving back t total) by mouth daily. Atorvastatin Calcium (LIPITOR) 40 MG Oral Tab,  TAKE ONE TABLET BY MOUTH AT BEDTIME   Sertraline HCl (ZOLOFT) 100 MG Oral Tab,  Take 100 mg by mouth daily. NON FORMULARY,  Post op shoe    Dx: toe fracture x 3 right foot.        Fa rebound or guarding  Extremities: No clubbing/cyanosis/edema. Skin: No rashes, no pallor  Neuro: Awake oriented ×3. Nonfocal.  Good strength throughout. Normal speech.   Nonfocal.    ED Course     Labs Reviewed   PROTHROMBIN TIME (PT) - Abnormal; Notable

## 2017-07-15 NOTE — ED NOTES
Pt reports recent trip to \"[employer]\"  and drove back several days ago. Pt reports 7 hr drive home and states he drank a lot of fluids, but mostly \"coffee\".  While on vacation he admits to \"binge\" drinking alcohol and feels his symptoms may be \"just d

## 2017-07-15 NOTE — ED NOTES
Pt back in room from CT. Pt reports feeling \"much better\" after 1/2 liter infusion of IV fluids,see MAR.

## 2017-12-31 ENCOUNTER — PRIOR ORIGINAL RECORDS (OUTPATIENT)
Dept: OTHER | Age: 57
End: 2017-12-31

## 2018-06-29 PROCEDURE — 87205 SMEAR GRAM STAIN: CPT | Performed by: NURSE PRACTITIONER

## 2018-06-29 PROCEDURE — 87186 SC STD MICRODIL/AGAR DIL: CPT | Performed by: NURSE PRACTITIONER

## 2018-06-29 PROCEDURE — 87070 CULTURE OTHR SPECIMN AEROBIC: CPT | Performed by: NURSE PRACTITIONER

## 2018-06-29 PROCEDURE — 87147 CULTURE TYPE IMMUNOLOGIC: CPT | Performed by: NURSE PRACTITIONER

## 2018-06-29 PROCEDURE — 87075 CULTR BACTERIA EXCEPT BLOOD: CPT | Performed by: NURSE PRACTITIONER

## 2019-02-04 ENCOUNTER — APPOINTMENT (OUTPATIENT)
Dept: GENERAL RADIOLOGY | Facility: HOSPITAL | Age: 59
End: 2019-02-04
Attending: EMERGENCY MEDICINE
Payer: COMMERCIAL

## 2019-02-04 ENCOUNTER — HOSPITAL ENCOUNTER (EMERGENCY)
Facility: HOSPITAL | Age: 59
Discharge: HOME | End: 2019-02-04
Attending: EMERGENCY MEDICINE
Payer: COMMERCIAL

## 2019-02-04 VITALS
TEMPERATURE: 97 F | BODY MASS INDEX: 33.64 KG/M2 | SYSTOLIC BLOOD PRESSURE: 129 MMHG | WEIGHT: 235 LBS | OXYGEN SATURATION: 95 % | DIASTOLIC BLOOD PRESSURE: 64 MMHG | HEIGHT: 70 IN | RESPIRATION RATE: 18 BRPM | HEART RATE: 67 BPM

## 2019-02-04 DIAGNOSIS — M48.50XA SPINAL COMPRESSION FRACTURE (HCC): ICD-10-CM

## 2019-02-04 DIAGNOSIS — M54.32 SCIATICA OF LEFT SIDE: Primary | ICD-10-CM

## 2019-02-04 PROCEDURE — 99284 EMERGENCY DEPT VISIT MOD MDM: CPT

## 2019-02-04 PROCEDURE — 72110 X-RAY EXAM L-2 SPINE 4/>VWS: CPT | Performed by: EMERGENCY MEDICINE

## 2019-02-04 PROCEDURE — 73502 X-RAY EXAM HIP UNI 2-3 VIEWS: CPT | Performed by: EMERGENCY MEDICINE

## 2019-02-04 RX ORDER — METHYLPREDNISOLONE 4 MG/1
TABLET ORAL
Qty: 1 PACKAGE | Refills: 0 | Status: SHIPPED | OUTPATIENT
Start: 2019-02-04 | End: 2019-02-09

## 2019-02-04 RX ORDER — HYDROCODONE BITARTRATE AND ACETAMINOPHEN 5; 325 MG/1; MG/1
1 TABLET ORAL ONCE
Status: COMPLETED | OUTPATIENT
Start: 2019-02-04 | End: 2019-02-04

## 2019-02-04 RX ORDER — CYCLOBENZAPRINE HCL 10 MG
10 TABLET ORAL 3 TIMES DAILY PRN
Qty: 20 TABLET | Refills: 0 | Status: SHIPPED | OUTPATIENT
Start: 2019-02-04 | End: 2019-02-11

## 2019-02-04 RX ORDER — HYDROCODONE BITARTRATE AND ACETAMINOPHEN 5; 325 MG/1; MG/1
1-2 TABLET ORAL EVERY 4 HOURS PRN
Qty: 10 TABLET | Refills: 0 | Status: SHIPPED | OUTPATIENT
Start: 2019-02-04 | End: 2019-02-11

## 2019-02-04 NOTE — ED INITIAL ASSESSMENT (HPI)
Pt c/o L hip pain x 1 week; pt states the pain has worsened since yesterday and now he c/o of L food pain. Pt states he feels like his skin is tingling. Pt denies injury. Pt able to ambulated from wheelchair to exam cart. Wife at bedside.

## 2019-02-04 NOTE — ED PROVIDER NOTES
Patient Seen in: [facility] Emergency Department    History   Patient presents with:  Lower Extremity Injury (musculoskeletal)    Stated Complaint: Left hip pain progressively getting worse over the past week. No EVANGELIST.     HPI    Patient is a 62-year- past week. No EVANGELIST. Other systems are as noted in HPI. Constitutional and vital signs reviewed. All other systems reviewed and negative except as noted above.     Physical Exam     ED Triage Vitals [02/04/19 0854]   /82   Pulse 66   Resp 18   Te taking his medications with marijuana. Activity as tolerated. Return if new or worse symptoms and follow-up for further evaluation.           Disposition and Plan     Clinical Impression:  Sciatica of left side  (primary encounter diagnosis)  Spinal compr

## 2019-10-03 ENCOUNTER — HOSPITAL ENCOUNTER (OUTPATIENT)
Dept: CV DIAGNOSTICS | Facility: HOSPITAL | Age: 59
Discharge: HOME | End: 2019-10-03
Attending: FAMILY MEDICINE
Payer: COMMERCIAL

## 2019-10-03 ENCOUNTER — HOSPITAL ENCOUNTER (OUTPATIENT)
Dept: ULTRASOUND IMAGING | Facility: HOSPITAL | Age: 59
Discharge: HOME | End: 2019-10-03
Attending: FAMILY MEDICINE
Payer: COMMERCIAL

## 2019-10-03 DIAGNOSIS — R55 SYNCOPE: ICD-10-CM

## 2019-10-03 PROCEDURE — 93227 XTRNL ECG REC<48 HR R&I: CPT | Performed by: FAMILY MEDICINE

## 2019-10-03 PROCEDURE — 93225 XTRNL ECG REC<48 HRS REC: CPT | Performed by: FAMILY MEDICINE

## 2019-10-03 PROCEDURE — 93880 EXTRACRANIAL BILAT STUDY: CPT | Performed by: FAMILY MEDICINE

## 2019-10-03 PROCEDURE — 93226 XTRNL ECG REC<48 HR SCAN A/R: CPT | Performed by: FAMILY MEDICINE

## 2019-12-17 ENCOUNTER — HOSPITAL ENCOUNTER (OUTPATIENT)
Dept: MRI IMAGING | Facility: HOSPITAL | Age: 59
Discharge: HOME | End: 2019-12-17
Attending: FAMILY MEDICINE
Payer: COMMERCIAL

## 2019-12-17 DIAGNOSIS — S46.002A INJURY OF LEFT ROTATOR CUFF, INITIAL ENCOUNTER: ICD-10-CM

## 2019-12-17 PROCEDURE — 73221 MRI JOINT UPR EXTREM W/O DYE: CPT | Performed by: FAMILY MEDICINE

## 2019-12-20 PROBLEM — M75.102 ROTATOR CUFF SYNDROME, LEFT: Status: ACTIVE | Noted: 2019-12-20

## 2020-01-14 ENCOUNTER — OFFICE VISIT (OUTPATIENT)
Dept: PHYSICAL THERAPY | Age: 60
End: 2020-01-14
Attending: ORTHOPAEDIC SURGERY
Payer: COMMERCIAL

## 2020-01-14 PROCEDURE — 97110 THERAPEUTIC EXERCISES: CPT

## 2020-01-14 PROCEDURE — 97161 PT EVAL LOW COMPLEX 20 MIN: CPT

## 2020-01-14 PROCEDURE — 97140 MANUAL THERAPY 1/> REGIONS: CPT

## 2020-01-14 NOTE — PROGRESS NOTES
SHOULDER EVALUATION:   Referring Physician: Dr. Mara Batres  Diagnosis:  L shoulder dysfunction  Supraspinatus Tendinosis   Date of Service: 1/14/2020     PATIENT SUMMARY   Claudette Cocker is a 62year old male who presents to therapy today with comp and tissue restrictions. Has loss of general shoulder strength and scapular muscle strength.    Functional deficits include but are not limited to unable to use UE for above head reaching or lifting, unable to reach behind back for washing or dressing, unab Total Timed Treatment: 25 min     Total Treatment Time: 50 min     PLAN OF CARE:    Goals: (To be met in 12 visits)   · Pt will report improved ability to sleep without waking due to shoulder pain  · Pt will improve shoulder flexion AROM to >160 degrees

## 2020-01-16 ENCOUNTER — OFFICE VISIT (OUTPATIENT)
Dept: PHYSICAL THERAPY | Age: 60
End: 2020-01-16
Attending: ORTHOPAEDIC SURGERY
Payer: COMMERCIAL

## 2020-01-16 PROCEDURE — 97140 MANUAL THERAPY 1/> REGIONS: CPT

## 2020-01-16 PROCEDURE — 97110 THERAPEUTIC EXERCISES: CPT

## 2020-01-16 NOTE — PROGRESS NOTES
Diagnosis: L shoulder Dysfunction  Supraspinatus tendinosis  Insurance Type (# Auth): O [id number]     Subjective: Reports shoulder with less stiffness and pain, improved ROM with regular HEP since last seen.      Objective    Treatment Number: 2 of 8    Man PT: 1 function with ADLs including little to no difficulty with using kitchen cookware, pots, pans, etc for food preporation.    · Pt will demonstrate increased mid/low trap strength to 4/5 to promote improved shoulder mechanics and stabilization with ADL such as

## 2020-01-21 ENCOUNTER — OFFICE VISIT (OUTPATIENT)
Dept: PHYSICAL THERAPY | Age: 60
End: 2020-01-21
Attending: ORTHOPAEDIC SURGERY
Payer: COMMERCIAL

## 2020-01-21 PROCEDURE — 97140 MANUAL THERAPY 1/> REGIONS: CPT

## 2020-01-21 PROCEDURE — 97110 THERAPEUTIC EXERCISES: CPT

## 2020-01-21 NOTE — PROGRESS NOTES
Diagnosis: L shoulder Dysfunction  Supraspinatus tendinosis  Insurance Type (# Auth): HMO [id number]     Subjective: Reports shoulder with less stiffness and pain, improved ROM with regular HEP since last seen.  Still with difficulty with behind back reaching and re In progress  · Pt will improve shoulder abduction AROM to >140 degrees to improve ability to don deodorant, don/doff shirts, and wash hair In progress  · Pt will increase shoulder AROM ABD/ER to > 75 to reach and fasten seatbelt  In progress  · Pt will inc

## 2020-01-23 ENCOUNTER — APPOINTMENT (OUTPATIENT)
Dept: PHYSICAL THERAPY | Age: 60
End: 2020-01-23
Attending: ORTHOPAEDIC SURGERY
Payer: COMMERCIAL

## 2020-01-28 ENCOUNTER — OFFICE VISIT (OUTPATIENT)
Dept: PHYSICAL THERAPY | Age: 60
End: 2020-01-28
Attending: ORTHOPAEDIC SURGERY
Payer: COMMERCIAL

## 2020-01-28 PROCEDURE — 97140 MANUAL THERAPY 1/> REGIONS: CPT

## 2020-01-28 PROCEDURE — 97110 THERAPEUTIC EXERCISES: CPT

## 2020-01-28 NOTE — PROGRESS NOTES
Diagnosis: L shoulder Dysfunction  Supraspinatus tendinosis  Insurance Type (# Auth): HMO [id number]     Subjective: Pt reports shoulder stiff but \" coming along\".   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

## 2020-01-30 ENCOUNTER — OFFICE VISIT (OUTPATIENT)
Dept: PHYSICAL THERAPY | Age: 60
End: 2020-01-30
Attending: ORTHOPAEDIC SURGERY
Payer: COMMERCIAL

## 2020-01-30 PROCEDURE — 97110 THERAPEUTIC EXERCISES: CPT

## 2020-01-30 PROCEDURE — 97140 MANUAL THERAPY 1/> REGIONS: CPT

## 2020-01-30 NOTE — PROGRESS NOTES
Diagnosis: L shoulder Dysfunction  Supraspinatus tendinosis  Insurance Type (# Auth): HMO [id number]     Subjective: Pt reports shoulder stiff but \" coming along\".   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

## 2020-02-03 ENCOUNTER — OFFICE VISIT (OUTPATIENT)
Dept: PHYSICAL THERAPY | Age: 60
End: 2020-02-03
Attending: ORTHOPAEDIC SURGERY
Payer: COMMERCIAL

## 2020-02-03 PROCEDURE — 97110 THERAPEUTIC EXERCISES: CPT

## 2020-02-03 PROCEDURE — 97140 MANUAL THERAPY 1/> REGIONS: CPT

## 2020-02-03 NOTE — PROGRESS NOTES
Diagnosis: L shoulder Dysfunction  Supraspinatus tendinosis  Insurance Type (# Auth): HMO [id number]     Subjective: HEP remains sporadic due to busy work schedule as .  Pleased with decrease in shoulder pain and increase in flexibility and ability to pain In progress  · Pt will improve shoulder strength throughout to 4/5 or > to improve function with ADLs including little to no difficulty with using kitchen cookware, pots, pans, etc for food p   reporation.    · Pt will demonstrate increased mid/low tra

## 2020-02-05 ENCOUNTER — OFFICE VISIT (OUTPATIENT)
Dept: PHYSICAL THERAPY | Age: 60
End: 2020-02-05
Attending: ORTHOPAEDIC SURGERY
Payer: COMMERCIAL

## 2020-02-05 PROCEDURE — 97110 THERAPEUTIC EXERCISES: CPT

## 2020-02-05 PROCEDURE — 97140 MANUAL THERAPY 1/> REGIONS: CPT

## 2020-02-05 NOTE — PROGRESS NOTES
Diagnosis: L shoulder Dysfunction  Supraspinatus tendinosis  Insurance Type (# Auth): HMO [id number]     Subjective: Emeka Richardson Pleased with decrease in shoulder pain and increase in flexibility and ability to use arm with minimal complaints.      Objective    Treatment Num progress  · Pt will increase shoulder AROM ABD/IR to > 40 to be able to reach in back pocket, tuck in shirt, and turn steering wheel without pain In progress  · Pt will improve shoulder strength throughout to 4/5 or > to improve function with ADLs includin

## 2020-10-09 LAB
ABSOLUTE BASOPHILS: 27 CELLS/UL (ref 0–200)
ABSOLUTE BASOPHILS: 47 CELLS/UL (ref 0–200)
ABSOLUTE EOSINOPHILS: 112 CELLS/UL (ref 15–500)
ABSOLUTE EOSINOPHILS: 116 CELLS/UL (ref 15–500)
ABSOLUTE LYMPHOCYTES: 1525 CELLS/UL (ref 850–3900)
ABSOLUTE LYMPHOCYTES: 2074 CELLS/UL (ref 850–3900)
ABSOLUTE MONOCYTES: 660 CELLS/UL (ref 200–950)
ABSOLUTE MONOCYTES: 712 CELLS/UL (ref 200–950)
ABSOLUTE NEUTROPHILS: 5972 CELLS/UL (ref 1500–7800)
ABSOLUTE NEUTROPHILS: 6956 CELLS/UL (ref 1500–7800)
ALBUMIN/GLOB SERPL: 1.7 (CALC) (ref 1–2.5)
ALBUMIN/GLOB SERPL: 1.8 (CALC) (ref 1–2.5)
ALBUMIN: 4.3 G/DL (ref 3.6–5.1)
ALBUMIN: 4.5 G/DL (ref 3.6–5.1)
ALKALINE PHOSPHATASE: 70 UNIT/L (ref 40–115)
ALKALINE PHOSPHATASE: 82 UNIT/L (ref 40–115)
ALT: 37 UNIT/L (ref 9–46)
ALT: 47 UNIT/L (ref 9–46)
AST: 23 UNIT/L (ref 10–35)
AST: 23 UNIT/L (ref 10–35)
BASOPHILS: 0.3 %
BASOPHILS: 0.5 %
BILIRUBIN, TOTAL: 0.4 MG/DL (ref 0.2–1.2)
BILIRUBIN, TOTAL: 0.4 MG/DL (ref 0.2–1.2)
BUN/CREATININE RATIO: ABNORMAL (CALC) (ref 6–22)
BUN/CREATININE RATIO: ABNORMAL (CALC) (ref 6–22)
CALCIUM: 9.4 MG/DL (ref 8.6–10.3)
CALCIUM: 9.9 MG/DL (ref 8.6–10.3)
CARBON DIOXIDE: 24 MMOL/L (ref 20–31)
CARBON DIOXIDE: 24 MMOL/L (ref 20–31)
CHLORIDE: 104 MMOL/L (ref 98–110)
CHLORIDE: 107 MMOL/L (ref 98–110)
CRCL (C&G) (MOSAIQ HL): 131.96 ML/MIN
CRCL (C&G) (MOSAIQ HL): 135.46 ML/MIN
CREATININE CLEARANCE (MOSAIQ HL): 73.5 ML/MIN
CREATININE CLEARANCE (MOSAIQ HL): 75.1 ML/MIN
CREATININE: 0.9 MG/DL (ref 0.7–1.33)
CREATININE: 0.92 MG/DL (ref 0.7–1.33)
EGFR AFRICAN AMERICAN: 106 ML/MIN/1.73M2
EGFR AFRICAN AMERICAN: 109 ML/MIN/1.73M2
EGFR NON-AFR. AMERICAN: 91 ML/MIN/1.73M2
EGFR NON-AFR. AMERICAN: 94 ML/MIN/1.73M2
EOSINOPHILS: 1.2 %
EOSINOPHILS: 1.3 %
GLOBULIN: 2.5 G/DL (CALC) (ref 1.9–3.7)
GLOBULIN: 2.5 G/DL (CALC) (ref 1.9–3.7)
GLUCOSE: 134 MG/DL (ref 65–99)
GLUCOSE: 155 MG/DL (ref 65–99)
HEMATOCRIT: 44.3 % (ref 38.5–50)
HEMATOCRIT: 46.1 % (ref 38.5–50)
HEMOGLOBIN: 15 G/DL (ref 13.2–17.1)
HEMOGLOBIN: 15.5 G/DL (ref 13.2–17.1)
HOC INR: NORMAL
HOC INR: NORMAL
HOC PT: NORMAL
INTERNATIONAL NORMALIZED$RATIO: 1
INTERNATIONAL NORMALIZED$RATIO: 1.4
LYMPHOCYTES: 16.4 %
LYMPHOCYTES: 23.3 %
MCH: 31.1 PG (ref 27–33)
MCH: 31.3 PG (ref 27–33)
MCHC: 33.6 G/DL (ref 32–36)
MCHC: 33.8 G/DL (ref 32–36)
MCV: 92.1 FML (ref 80–100)
MCV: 93.3 FML (ref 80–100)
MONOCYTES: 7.1 %
MONOCYTES: 8 %
MPV: 9.2 FML (ref 7.5–12.5)
MPV: 9.3 FML (ref 7.5–12.5)
NEUTROPHILS: 67.1 %
NEUTROPHILS: 74.8 %
PLATELET COUNT: 167 THOUSAND/UL (ref 140–400)
PLATELET COUNT: 169 THOUSAND/UL (ref 140–400)
POTASSIUM: 4.5 MMOL/L (ref 3.5–5.3)
POTASSIUM: 5.5 MMOL/L (ref 3.5–5.3)
PROTEIN, TOTAL: 6.8 G/DL (ref 6.1–8.1)
PROTEIN, TOTAL: 7 G/DL (ref 6.1–8.1)
PROTHROMBIN TIME: 14.1 SEC (ref 9–11.5)
PROTHROMBIN TIME: 9.8 SEC (ref 9–11.5)
PTT: 25 SEC (ref 22–34)
RDW: 13.9 % (ref 11–15)
RDW: 13.9 % (ref 11–15)
RED BLOOD CELL COUNT: 4.81 MILLION/UL (ref 4.2–5.8)
RED BLOOD CELL COUNT: 4.94 MILLION/UL (ref 4.2–5.8)
SODIUM: 137 MMOL/L (ref 135–146)
SODIUM: 140 MMOL/L (ref 135–146)
UREA NITROGEN (BUN): 17 MG/DL (ref 7–25)
UREA NITROGEN (BUN): 18 MG/DL (ref 7–25)
WHITE BLOOD CELL COUNT: 8.9 THOUSAND/UL (ref 3.8–10.8)
WHITE BLOOD CELL COUNT: 9.3 THOUSAND/UL (ref 3.8–10.8)

## 2020-10-11 VITALS
SYSTOLIC BLOOD PRESSURE: 126 MMHG | BODY MASS INDEX: 32.9 KG/M2 | DIASTOLIC BLOOD PRESSURE: 70 MMHG | HEIGHT: 71 IN | WEIGHT: 235.01 LBS

## 2020-10-11 VITALS — DIASTOLIC BLOOD PRESSURE: 80 MMHG | WEIGHT: 236 LBS | SYSTOLIC BLOOD PRESSURE: 120 MMHG

## 2021-03-18 ENCOUNTER — ORDER TRANSCRIPTION (OUTPATIENT)
Dept: ADMINISTRATIVE | Facility: HOSPITAL | Age: 61
End: 2021-03-18

## 2021-03-18 DIAGNOSIS — Z23 NEED FOR VACCINATION: ICD-10-CM

## 2021-03-18 DIAGNOSIS — I26.99 PULMONARY EMBOLISM WITH INFARCTION (HCC): ICD-10-CM

## 2021-03-18 DIAGNOSIS — E11.9 DIABETES MELLITUS (HCC): ICD-10-CM

## 2021-03-18 DIAGNOSIS — E11.9 DM W/O COMPLICATION TYPE II (HCC): ICD-10-CM

## 2021-03-18 DIAGNOSIS — E78.5 HYPERLIPIDEMIA, UNSPECIFIED: Primary | ICD-10-CM

## 2021-03-18 DIAGNOSIS — I25.10 CORONARY ATHEROSCLEROSIS: ICD-10-CM

## 2021-06-09 ENCOUNTER — HOSPITAL ENCOUNTER (OUTPATIENT)
Dept: CT IMAGING | Age: 61
Discharge: HOME | End: 2021-06-09
Attending: FAMILY MEDICINE
Payer: COMMERCIAL

## 2021-06-09 ENCOUNTER — LAB ENCOUNTER (OUTPATIENT)
Dept: LAB | Facility: HOSPITAL | Age: 61
End: 2021-06-09
Attending: FAMILY MEDICINE
Payer: COMMERCIAL

## 2021-06-09 DIAGNOSIS — R10.32 LEFT GROIN PAIN: ICD-10-CM

## 2021-06-09 DIAGNOSIS — M54.9 BACK PAIN: ICD-10-CM

## 2021-06-09 DIAGNOSIS — R10.0 ACUTE ABDOMEN: ICD-10-CM

## 2021-06-09 DIAGNOSIS — R10.0 ACUTE ABDOMINAL PAIN SYNDROME: ICD-10-CM

## 2021-06-09 DIAGNOSIS — R10.30 GROIN PAIN: ICD-10-CM

## 2021-06-09 DIAGNOSIS — M54.9 BACK PAIN: Primary | ICD-10-CM

## 2021-06-09 PROCEDURE — 82565 ASSAY OF CREATININE: CPT

## 2021-06-09 PROCEDURE — 80053 COMPREHEN METABOLIC PANEL: CPT

## 2021-06-09 PROCEDURE — 36415 COLL VENOUS BLD VENIPUNCTURE: CPT

## 2021-06-09 PROCEDURE — 85025 COMPLETE CBC W/AUTO DIFF WBC: CPT

## 2021-06-09 PROCEDURE — 74177 CT ABD & PELVIS W/CONTRAST: CPT | Performed by: FAMILY MEDICINE

## 2021-06-30 ENCOUNTER — HOSPITAL ENCOUNTER (OUTPATIENT)
Dept: GENERAL RADIOLOGY | Facility: HOSPITAL | Age: 61
Discharge: HOME | End: 2021-06-30
Attending: FAMILY MEDICINE
Payer: COMMERCIAL

## 2021-06-30 ENCOUNTER — LAB ENCOUNTER (OUTPATIENT)
Dept: LAB | Facility: HOSPITAL | Age: 61
End: 2021-06-30
Attending: FAMILY MEDICINE
Payer: COMMERCIAL

## 2021-06-30 ENCOUNTER — HOSPITAL ENCOUNTER (OUTPATIENT)
Dept: CV DIAGNOSTICS | Facility: HOSPITAL | Age: 61
Discharge: HOME | End: 2021-06-30
Attending: FAMILY MEDICINE
Payer: COMMERCIAL

## 2021-06-30 DIAGNOSIS — T73.3XXA EXHAUSTION DUE TO EXCESSIVE EXERTION, INITIAL ENCOUNTER: ICD-10-CM

## 2021-06-30 DIAGNOSIS — I26.99 PULMONARY EMBOLISM (HCC): ICD-10-CM

## 2021-06-30 DIAGNOSIS — R53.83 FATIGUE: Primary | ICD-10-CM

## 2021-06-30 DIAGNOSIS — E78.00 PURE HYPERCHOLESTEROLEMIA: ICD-10-CM

## 2021-06-30 DIAGNOSIS — E10.9 DM W/O COMPLICATION TYPE I (HCC): ICD-10-CM

## 2021-06-30 DIAGNOSIS — R53.83 FATIGUE: ICD-10-CM

## 2021-06-30 DIAGNOSIS — I26.99 PULMONARY THROMBOSIS (HCC): ICD-10-CM

## 2021-06-30 LAB
CHOLEST SMN-MCNC: 148 MG/DL (ref ?–200)
CRP SERPL-MCNC: 0.73 MG/DL (ref ?–0.3)
D-DIMER: <0.27 UG/ML FEU (ref ?–0.62)
EST. AVERAGE GLUCOSE BLD GHB EST-MCNC: 220 MG/DL (ref 68–126)
HBA1C MFR BLD HPLC: 9.3 % (ref ?–5.7)
HDLC SERPL-MCNC: 50 MG/DL (ref 40–59)
LDLC SERPL CALC-MCNC: 77 MG/DL (ref ?–100)
NONHDLC SERPL-MCNC: 98 MG/DL (ref ?–130)
SED RATE-ML: 13 MM/HR
T4 FREE SERPL-MCNC: 0.8 NG/DL (ref 0.8–1.7)
TRIGL SERPL-MCNC: 116 MG/DL (ref 30–149)
TSI SER-ACNC: 1.24 MIU/ML (ref 0.36–3.74)
VLDLC SERPL CALC-MCNC: 18 MG/DL (ref 0–30)

## 2021-06-30 PROCEDURE — 78452 HT MUSCLE IMAGE SPECT MULT: CPT | Performed by: FAMILY MEDICINE

## 2021-06-30 PROCEDURE — 80061 LIPID PANEL: CPT

## 2021-06-30 PROCEDURE — 84439 ASSAY OF FREE THYROXINE: CPT

## 2021-06-30 PROCEDURE — 84443 ASSAY THYROID STIM HORMONE: CPT

## 2021-06-30 PROCEDURE — 83036 HEMOGLOBIN GLYCOSYLATED A1C: CPT

## 2021-06-30 PROCEDURE — 93018 CV STRESS TEST I&R ONLY: CPT | Performed by: FAMILY MEDICINE

## 2021-06-30 PROCEDURE — 36415 COLL VENOUS BLD VENIPUNCTURE: CPT

## 2021-06-30 PROCEDURE — 93017 CV STRESS TEST TRACING ONLY: CPT | Performed by: FAMILY MEDICINE

## 2021-06-30 PROCEDURE — 85652 RBC SED RATE AUTOMATED: CPT

## 2021-06-30 PROCEDURE — 71046 X-RAY EXAM CHEST 2 VIEWS: CPT | Performed by: FAMILY MEDICINE

## 2021-06-30 PROCEDURE — 85379 FIBRIN DEGRADATION QUANT: CPT

## 2021-06-30 PROCEDURE — 86140 C-REACTIVE PROTEIN: CPT

## 2022-08-25 ENCOUNTER — HOSPITAL ENCOUNTER (OUTPATIENT)
Dept: CV DIAGNOSTICS | Facility: HOSPITAL | Age: 62
Discharge: HOME | End: 2022-08-25
Attending: FAMILY MEDICINE
Payer: COMMERCIAL

## 2022-08-25 DIAGNOSIS — R42 VERTIGO: ICD-10-CM

## 2022-08-25 DIAGNOSIS — I95.1 ORTHOSTATIC HYPOTENSION: ICD-10-CM

## 2022-08-25 DIAGNOSIS — F17.200 TOBACCO USE DISORDER: ICD-10-CM

## 2022-08-25 PROCEDURE — 93271 ECG/MONITORING AND ANALYSIS: CPT | Performed by: FAMILY MEDICINE

## 2022-08-25 PROCEDURE — 93270 REMOTE 30 DAY ECG REV/REPORT: CPT | Performed by: FAMILY MEDICINE

## 2022-08-30 PROBLEM — D12.3 BENIGN NEOPLASM OF TRANSVERSE COLON: Status: ACTIVE | Noted: 2022-08-30

## 2022-08-30 PROBLEM — Z12.11 SPECIAL SCREENING FOR MALIGNANT NEOPLASM OF COLON: Status: ACTIVE | Noted: 2022-08-30

## 2022-08-30 PROBLEM — D12.5 BENIGN NEOPLASM OF SIGMOID COLON: Status: ACTIVE | Noted: 2022-08-30

## 2022-08-31 PROCEDURE — 88305 TISSUE EXAM BY PATHOLOGIST: CPT | Performed by: INTERNAL MEDICINE

## 2023-05-11 ENCOUNTER — PATIENT OUTREACH (OUTPATIENT)
Dept: FAMILY MEDICINE CLINIC | Facility: CLINIC | Age: 63
End: 2023-05-11

## 2023-05-17 ENCOUNTER — PATIENT OUTREACH (OUTPATIENT)
Dept: CASE MANAGEMENT | Age: 63
End: 2023-05-17

## 2023-05-18 NOTE — PROGRESS NOTES
Called the patient. Patient did not answer. Called the pt's wife, per HIPPA form. Per pt's wife, pt's PCP is Padmaja Isabel and not Dr. Phillip Burch. Pt's wife will call [employer] and update her PCP.

## 2023-08-11 ENCOUNTER — APPOINTMENT (OUTPATIENT)
Dept: GENERAL RADIOLOGY | Facility: HOSPITAL | Age: 63
End: 2023-08-11
Payer: COMMERCIAL

## 2023-08-11 ENCOUNTER — APPOINTMENT (OUTPATIENT)
Dept: CT IMAGING | Facility: HOSPITAL | Age: 63
End: 2023-08-11
Attending: EMERGENCY MEDICINE
Payer: COMMERCIAL

## 2023-08-11 ENCOUNTER — HOSPITAL ENCOUNTER (EMERGENCY)
Facility: HOSPITAL | Age: 63
Discharge: HOME | End: 2023-08-11
Attending: EMERGENCY MEDICINE
Payer: COMMERCIAL

## 2023-08-11 VITALS
RESPIRATION RATE: 25 BRPM | BODY MASS INDEX: 33.36 KG/M2 | DIASTOLIC BLOOD PRESSURE: 85 MMHG | SYSTOLIC BLOOD PRESSURE: 159 MMHG | HEART RATE: 79 BPM | HEIGHT: 70 IN | WEIGHT: 233 LBS | TEMPERATURE: 98 F | OXYGEN SATURATION: 95 %

## 2023-08-11 DIAGNOSIS — J98.01 BRONCHOSPASM: ICD-10-CM

## 2023-08-11 DIAGNOSIS — J22 LOWER RESPIRATORY INFECTION: Primary | ICD-10-CM

## 2023-08-11 DIAGNOSIS — R42 VERTIGO: ICD-10-CM

## 2023-08-11 LAB
ALBUMIN SERPL-MCNC: 3.8 G/DL (ref 3.4–5)
ALBUMIN/GLOB SERPL: 0.9 {RATIO} (ref 1–2)
ALP LIVER SERPL-CCNC: 68 U/L
ALT SERPL-CCNC: 29 U/L
ANION GAP SERPL CALC-SCNC: 6 MMOL/L (ref 0–18)
AST SERPL-CCNC: 13 U/L (ref 15–37)
BASOPHILS # BLD AUTO: 0.04 X10(3) UL (ref 0–0.2)
BASOPHILS NFR BLD AUTO: 0.3 %
BILIRUB SERPL-MCNC: 0.7 MG/DL (ref 0.1–2)
BILIRUB UR QL STRIP.AUTO: NEGATIVE
BUN BLD-MCNC: 15 MG/DL (ref 7–18)
CALCIUM BLD-MCNC: 9.4 MG/DL (ref 8.5–10.1)
CHLORIDE SERPL-SCNC: 103 MMOL/L (ref 98–112)
CLARITY UR REFRACT.AUTO: CLEAR
CO2 SERPL-SCNC: 24 MMOL/L (ref 21–32)
COLOR UR AUTO: YELLOW
CREAT BLD-MCNC: 1.1 MG/DL
EGFRCR SERPLBLD CKD-EPI 2021: 75 ML/MIN/1.73M2 (ref 60–?)
EOSINOPHIL # BLD AUTO: 0.07 X10(3) UL (ref 0–0.7)
EOSINOPHIL NFR BLD AUTO: 0.5 %
ERYTHROCYTE [DISTWIDTH] IN BLOOD BY AUTOMATED COUNT: 13 %
FLUAV + FLUBV RNA SPEC NAA+PROBE: NEGATIVE
FLUAV + FLUBV RNA SPEC NAA+PROBE: NEGATIVE
GLOBULIN PLAS-MCNC: 4.2 G/DL (ref 2.8–4.4)
GLUCOSE BLD-MCNC: 173 MG/DL (ref 70–99)
GLUCOSE UR STRIP.AUTO-MCNC: NEGATIVE MG/DL
HCT VFR BLD AUTO: 44.1 %
HGB BLD-MCNC: 15.5 G/DL
IMM GRANULOCYTES # BLD AUTO: 0.06 X10(3) UL (ref 0–1)
IMM GRANULOCYTES NFR BLD: 0.4 %
KETONES UR STRIP.AUTO-MCNC: NEGATIVE MG/DL
LEUKOCYTE ESTERASE UR QL STRIP.AUTO: NEGATIVE
LYMPHOCYTES # BLD AUTO: 1.98 X10(3) UL (ref 1–4)
LYMPHOCYTES NFR BLD AUTO: 14.2 %
MCH RBC QN AUTO: 31.8 PG (ref 26–34)
MCHC RBC AUTO-ENTMCNC: 35.1 G/DL (ref 31–37)
MCV RBC AUTO: 90.4 FL
MONOCYTES # BLD AUTO: 1.33 X10(3) UL (ref 0.1–1)
MONOCYTES NFR BLD AUTO: 9.5 %
NEUTROPHILS # BLD AUTO: 10.49 X10 (3) UL (ref 1.5–7.7)
NEUTROPHILS # BLD AUTO: 10.49 X10(3) UL (ref 1.5–7.7)
NEUTROPHILS NFR BLD AUTO: 75.1 %
NITRITE UR QL STRIP.AUTO: NEGATIVE
OSMOLALITY SERPL CALC.SUM OF ELEC: 281 MOSM/KG (ref 275–295)
PH UR STRIP.AUTO: 5 [PH] (ref 5–8)
PLATELET # BLD AUTO: 134 10(3)UL (ref 150–450)
POTASSIUM SERPL-SCNC: 4.7 MMOL/L (ref 3.5–5.1)
PROT SERPL-MCNC: 8 G/DL (ref 6.4–8.2)
PROT UR STRIP.AUTO-MCNC: NEGATIVE MG/DL
RBC # BLD AUTO: 4.88 X10(6)UL
RBC UR QL AUTO: NEGATIVE
RSV RNA SPEC NAA+PROBE: NEGATIVE
SARS-COV-2 RNA RESP QL NAA+PROBE: NOT DETECTED
SODIUM SERPL-SCNC: 133 MMOL/L (ref 136–145)
SP GR UR STRIP.AUTO: 1.01 (ref 1–1.03)
UROBILINOGEN UR STRIP.AUTO-MCNC: <2 MG/DL
WBC # BLD AUTO: 14 X10(3) UL (ref 4–11)

## 2023-08-11 PROCEDURE — 99284 EMERGENCY DEPT VISIT MOD MDM: CPT

## 2023-08-11 PROCEDURE — 0241U SARS-COV-2/FLU A AND B/RSV BY PCR (GENEXPERT): CPT

## 2023-08-11 PROCEDURE — 36415 COLL VENOUS BLD VENIPUNCTURE: CPT

## 2023-08-11 PROCEDURE — 80053 COMPREHEN METABOLIC PANEL: CPT | Performed by: EMERGENCY MEDICINE

## 2023-08-11 PROCEDURE — 94640 AIRWAY INHALATION TREATMENT: CPT

## 2023-08-11 PROCEDURE — 81003 URINALYSIS AUTO W/O SCOPE: CPT | Performed by: EMERGENCY MEDICINE

## 2023-08-11 PROCEDURE — 99285 EMERGENCY DEPT VISIT HI MDM: CPT

## 2023-08-11 PROCEDURE — 71045 X-RAY EXAM CHEST 1 VIEW: CPT

## 2023-08-11 PROCEDURE — 70450 CT HEAD/BRAIN W/O DYE: CPT | Performed by: EMERGENCY MEDICINE

## 2023-08-11 PROCEDURE — 85025 COMPLETE CBC W/AUTO DIFF WBC: CPT | Performed by: EMERGENCY MEDICINE

## 2023-08-11 RX ORDER — IPRATROPIUM BROMIDE AND ALBUTEROL SULFATE 2.5; .5 MG/3ML; MG/3ML
3 SOLUTION RESPIRATORY (INHALATION) ONCE
Status: COMPLETED | OUTPATIENT
Start: 2023-08-11 | End: 2023-08-11

## 2023-08-11 RX ORDER — CEFDINIR 300 MG/1
300 CAPSULE ORAL 2 TIMES DAILY
Qty: 14 CAPSULE | Refills: 0 | Status: SHIPPED | OUTPATIENT
Start: 2023-08-11 | End: 2023-08-18

## 2023-08-11 RX ORDER — AZITHROMYCIN 250 MG/1
TABLET, FILM COATED ORAL
Qty: 6 TABLET | Refills: 0 | Status: SHIPPED | OUTPATIENT
Start: 2023-08-11 | End: 2023-08-16

## 2023-08-11 RX ORDER — PREDNISONE 20 MG/1
40 TABLET ORAL DAILY
Qty: 10 TABLET | Refills: 0 | Status: SHIPPED | OUTPATIENT
Start: 2023-08-11 | End: 2023-08-16

## 2023-08-11 RX ORDER — PREDNISONE 20 MG/1
60 TABLET ORAL ONCE
Status: COMPLETED | OUTPATIENT
Start: 2023-08-11 | End: 2023-08-11

## 2023-08-11 RX ORDER — ALBUTEROL SULFATE 90 UG/1
2 AEROSOL, METERED RESPIRATORY (INHALATION) EVERY 4 HOURS PRN
Qty: 1 EACH | Refills: 0 | Status: SHIPPED | OUTPATIENT
Start: 2023-08-11 | End: 2023-09-10

## 2023-08-11 RX ORDER — MECLIZINE HYDROCHLORIDE 25 MG/1
25 TABLET ORAL 3 TIMES DAILY PRN
Qty: 20 TABLET | Refills: 0 | Status: SHIPPED | OUTPATIENT
Start: 2023-08-11

## 2023-08-11 RX ORDER — ALBUTEROL SULFATE 2.5 MG/3ML
2.5 SOLUTION RESPIRATORY (INHALATION) ONCE
Status: DISCONTINUED | OUTPATIENT
Start: 2023-08-11 | End: 2023-08-11

## 2023-08-11 NOTE — ED INITIAL ASSESSMENT (HPI)
Patient to the ED ambulatory and A&Ox4/4 accompanied by his wife with c/o feeling dizzy since Monday. Patient has hx of vertigo. Patient states now it feels like there are shocks zapping through his head. Patient also feels SOB, tired like he wants to go to sleep. Patient also has headache. Cough, congestion. Coughing up grey mucous.

## 2023-08-11 NOTE — DISCHARGE INSTRUCTIONS
Take medication as prescribed. Follow-up with your doctor in 2 to 3 days. Check your INR every 2 days while taking antibiotics as they may increase your INR. Call your doctor for adjustment as needed. Return for any worsening symptoms.

## 2023-08-11 NOTE — ED QUICK NOTES
RT made aware of need for neb tx. Pt made aware of plan of care. Pt awake and alert, skin w/d,resps reg/unlabored.

## 2023-08-31 ENCOUNTER — TELEPHONE (OUTPATIENT)
Dept: FAMILY MEDICINE CLINIC | Facility: CLINIC | Age: 63
End: 2023-08-31

## 2023-11-06 ENCOUNTER — PATIENT OUTREACH (OUTPATIENT)
Dept: CASE MANAGEMENT | Age: 63
End: 2023-11-06

## 2023-11-06 NOTE — PROCEDURES
Received order requesting to update PCP to Dr. Gilberto Chambers is Denied and finalized on November 6, 2023. [facility] patient is listed on the November 2023 [facility] eligibility list:  [facility] Attributed PCP is Dr. Thomas Orr    Office, please contact patient to verify PCP. Inform patient they must contact [facility] to provide the name of current PCP - Dr. Gilberto Chambers. [facility] BC[employer] Members can change their PCP on the [facility] website, by emailing [email or web address]   [facility] [employer] Members can call [facility] customer service at [phone]  [facility] PCP assignment is handled by [facility], not [employer]    After office has verified patient is no longer listed on the [place] [facility] monthly eligibility list and not assigned to Dr. Titus Adan then office must resubmit a new order for PCP removal request.      Thanks,  [id number] [employer]

## 2024-07-29 ENCOUNTER — HOSPITAL ENCOUNTER (EMERGENCY)
Facility: HOSPITAL | Age: 64
Discharge: HOME | End: 2024-07-29
Attending: EMERGENCY MEDICINE
Payer: MEDICARE

## 2024-07-29 ENCOUNTER — APPOINTMENT (OUTPATIENT)
Dept: CT IMAGING | Facility: HOSPITAL | Age: 64
End: 2024-07-29
Attending: EMERGENCY MEDICINE
Payer: MEDICARE

## 2024-07-29 VITALS
HEART RATE: 66 BPM | BODY MASS INDEX: 32.93 KG/M2 | HEIGHT: 70 IN | TEMPERATURE: 98 F | OXYGEN SATURATION: 96 % | SYSTOLIC BLOOD PRESSURE: 148 MMHG | DIASTOLIC BLOOD PRESSURE: 76 MMHG | WEIGHT: 230 LBS | RESPIRATION RATE: 16 BRPM

## 2024-07-29 DIAGNOSIS — R79.1 SUPRATHERAPEUTIC INR: Primary | ICD-10-CM

## 2024-07-29 LAB
ANION GAP SERPL CALC-SCNC: 5 MMOL/L (ref 0–18)
BASOPHILS # BLD AUTO: 0.04 X10(3) UL (ref 0–0.2)
BASOPHILS NFR BLD AUTO: 0.4 %
BILIRUB UR QL STRIP.AUTO: NEGATIVE
BUN BLD-MCNC: 17 MG/DL (ref 9–23)
CALCIUM BLD-MCNC: 9.5 MG/DL (ref 8.7–10.4)
CHLORIDE SERPL-SCNC: 104 MMOL/L (ref 98–112)
CO2 SERPL-SCNC: 25 MMOL/L (ref 21–32)
CREAT BLD-MCNC: 0.98 MG/DL
EGFRCR SERPLBLD CKD-EPI 2021: 86 ML/MIN/1.73M2 (ref 60–?)
EOSINOPHIL # BLD AUTO: 0.14 X10(3) UL (ref 0–0.7)
EOSINOPHIL NFR BLD AUTO: 1.6 %
ERYTHROCYTE [DISTWIDTH] IN BLOOD BY AUTOMATED COUNT: 12.9 %
GLUCOSE BLD-MCNC: 196 MG/DL (ref 70–99)
GLUCOSE UR STRIP.AUTO-MCNC: NORMAL MG/DL
HCT VFR BLD AUTO: 45.7 %
HGB BLD-MCNC: 15.7 G/DL
IMM GRANULOCYTES # BLD AUTO: 0.08 X10(3) UL (ref 0–1)
IMM GRANULOCYTES NFR BLD: 0.9 %
INR BLD: 3.37 (ref 0.8–1.2)
KETONES UR STRIP.AUTO-MCNC: NEGATIVE MG/DL
LEUKOCYTE ESTERASE UR QL STRIP.AUTO: NEGATIVE
LYMPHOCYTES # BLD AUTO: 1.86 X10(3) UL (ref 1–4)
LYMPHOCYTES NFR BLD AUTO: 20.8 %
MCH RBC QN AUTO: 31.3 PG (ref 26–34)
MCHC RBC AUTO-ENTMCNC: 34.4 G/DL (ref 31–37)
MCV RBC AUTO: 91 FL
MONOCYTES # BLD AUTO: 0.66 X10(3) UL (ref 0.1–1)
MONOCYTES NFR BLD AUTO: 7.4 %
NEUTROPHILS # BLD AUTO: 6.16 X10 (3) UL (ref 1.5–7.7)
NEUTROPHILS # BLD AUTO: 6.16 X10(3) UL (ref 1.5–7.7)
NEUTROPHILS NFR BLD AUTO: 68.9 %
NITRITE UR QL STRIP.AUTO: NEGATIVE
OSMOLALITY SERPL CALC.SUM OF ELEC: 285 MOSM/KG (ref 275–295)
PH UR STRIP.AUTO: 5.5 [PH] (ref 5–8)
PLATELET # BLD AUTO: 164 10(3)UL (ref 150–450)
POTASSIUM SERPL-SCNC: 4.4 MMOL/L (ref 3.5–5.1)
PROT UR STRIP.AUTO-MCNC: 30 MG/DL
PROTHROMBIN TIME: 34.6 SECONDS (ref 11.6–14.8)
RBC # BLD AUTO: 5.02 X10(6)UL
RBC #/AREA URNS AUTO: >10 /HPF
SODIUM SERPL-SCNC: 134 MMOL/L (ref 136–145)
SP GR UR STRIP.AUTO: 1.01 (ref 1–1.03)
UROBILINOGEN UR STRIP.AUTO-MCNC: NORMAL MG/DL
WBC # BLD AUTO: 8.9 X10(3) UL (ref 4–11)

## 2024-07-29 PROCEDURE — 74176 CT ABD & PELVIS W/O CONTRAST: CPT | Performed by: EMERGENCY MEDICINE

## 2024-07-29 PROCEDURE — 81001 URINALYSIS AUTO W/SCOPE: CPT | Performed by: EMERGENCY MEDICINE

## 2024-07-29 PROCEDURE — 80048 BASIC METABOLIC PNL TOTAL CA: CPT | Performed by: EMERGENCY MEDICINE

## 2024-07-29 PROCEDURE — 99285 EMERGENCY DEPT VISIT HI MDM: CPT

## 2024-07-29 PROCEDURE — 85025 COMPLETE CBC W/AUTO DIFF WBC: CPT | Performed by: EMERGENCY MEDICINE

## 2024-07-29 PROCEDURE — 99284 EMERGENCY DEPT VISIT MOD MDM: CPT

## 2024-07-29 PROCEDURE — 36415 COLL VENOUS BLD VENIPUNCTURE: CPT

## 2024-07-29 PROCEDURE — 85610 PROTHROMBIN TIME: CPT | Performed by: EMERGENCY MEDICINE

## 2024-07-29 NOTE — ED INITIAL ASSESSMENT (HPI)
Hematuria since  yesterday with mild  lower abdominal pain . Denies any fever , no prostrate problem . Patient said he was in the cruise 2 days ago and he drink  excessively .

## 2024-07-29 NOTE — ED PROVIDER NOTES
Patient Seen in: [facility] Emergency Department      History     Chief Complaint   Patient presents with    Hematuria     Stated Complaint: blood in urine since yesterday    Subjective:   HPI    Patient for gross hematuria since yesterday.  He is anticoagulated with Coumadin for a history of coronary artery disease.  He occasionally has some left lower quadrant discomfort but no fevers no chills no sharp shooting pains.  No history of kidney stones.    Objective:   Past Medical History:    Anxiety state, unspecified    Coronary artery disease    Decorative tattoo    Depression    Diabetes mellitus (HCC)    Dizziness    Easy bruising    Fatigue    Hearing loss    HEART ATTACK    significant inferior wall MI with circumflex occlusion and a drug eluting stent placed    Hemorrhoids    High cholesterol    History of angioplasty    Hypercholesterolemia    Hyperlipidemia    Hypertension    Long term current use of anticoagulant    Other and unspecified hyperlipidemia    Prediabetes    Pulmonary embolism (HCC)    Sleep disturbance    Stented coronary artery    Unspecified essential hypertension    Wheezing              Past Surgical History:   Procedure Laterality Date    Angioplasty (coronary)  2011    Colonoscopy                  Social History     Socioeconomic History    Marital status:    Occupational History    Occupation:      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  Constitutional and vital signs reviewed.      All other systems reviewed and negative except as noted above.    Physical Exam     ED Triage Vitals [07/29/24 0954]   BP (!) 195/92   Pulse 74   Resp 18   Temp 97.8 °F (36.6 °C)   Temp src Temporal   SpO2 99 %   O2 Device None (Room air)       Current Vitals:   Vital Signs  BP: (!) 165/82  Pulse: 69  Resp: 18  Temp: 97.8 °F (36.6 °C)  Temp src: Temporal    Oxygen Therapy  SpO2: 96 %  O2 Device: None (Room air)            Physical Exam    Physical Exam   Constitutional: Awake, alert, well appearing  Head: Normocephalic and atraumatic.   Eyes: Conjunctivae are normal. Pupils are equal, round, and reactive to light.   Neck: Normal range of motion. No JVD  Cardiovascular: Normal rate, regular rhythm  Pulmonary/Chest: Normal effort.  No accessory muscle use.  No cyanosis.  Abdominal: Soft. Not distended.  Neurological: Pt is alert and oriented to person, place, and time. no cranial nerve deficits. Speech fluent      Belly benign no CVA tenderness    ED Course     Labs Reviewed   URINALYSIS WITH CULTURE REFLEX - Abnormal; Notable for the following components:       Result Value    Clarity Urine Ex.Turbid (*)     Blood Urine 3+ (*)     Protein Urine 30 (*)     WBC Urine 6-10 (*)     RBC Urine >10 (*)     Bacteria Urine 1+ (*)     All other components within normal limits   BASIC METABOLIC PANEL (8) - Abnormal; Notable for the following components:    Glucose 196 (*)     Sodium 134 (*)     All other components within normal limits   PROTHROMBIN TIME (PT) - Abnormal; Notable for the following components:    PT 34.6 (*)     INR 3.37 (*)     All other components within normal limits   CBC WITH DIFFERENTIAL WITH PLATELET    Narrative:     The following orders were created for panel order CBC With Differential With Platelet.  Procedure                               Abnormality         Status                      ---------                               -----------         ------                     CBC W/ DIFFERENTIAL[[id number]]                              Final result                 Please view results for these tests on the individual orders.   CBC W/ DIFFERENTIAL             Blood work reviewed.  Supratherapeutic INR.  Patient counseled to skip his Coumadin tonight and talk to his Coumadin clinic.  UA noted, not obviously infected.  Will await urine culture.  Positive culture should be treated.         MDM          Differential diagnoses considered: Supratherapeutic INR, kidney stone, Kidney mass/bladder mass    Will await urine culture before treating for potential UTI, he is on Coumadin and has a penicillin allergy-.    -Outpatient urology follow-up.  -Advised to hold Coumadin tonight and follow-up with his Coumadin clinic via telephone.      I visualized the radiology studies, my independent interpretation: No free air or free fluid noted on CT scan abdomen pelvis    *Discussion of ongoing management of this patient's care included: n/a  *Comorbidities contributing to the complexity of decision making: Coumadin use for coronary disease, supratherapeutic INR today.  *External charts reviewed: n/a  *Additional sources of history: n/a    Shared decision making was done by: patient, myself.                                     Medical Decision Making      Disposition and Plan     Clinical Impression:  1. Supratherapeutic INR         Disposition:  Discharge  7/29/2024 12:01 pm    Follow-up:  Alvaro Rubio MD  [address]  [facility] [address]  [phone]    Follow up            Medications Prescribed:  Current Discharge Medication List

## 2024-08-08 ENCOUNTER — OFFICE VISIT (OUTPATIENT)
Dept: SURGERY | Facility: CLINIC | Age: 64
End: 2024-08-08
Payer: COMMERCIAL

## 2024-08-08 DIAGNOSIS — R31.0 GROSS HEMATURIA: Primary | ICD-10-CM

## 2024-08-08 DIAGNOSIS — Z72.0 TOBACCO USE: ICD-10-CM

## 2024-08-08 DIAGNOSIS — R82.90 URINE FINDING: ICD-10-CM

## 2024-08-08 LAB
APPEARANCE: CLEAR
BILIRUBIN: NEGATIVE
GLUCOSE (URINE DIPSTICK): 500 MG/DL
LEUKOCYTES: NEGATIVE
MULTISTIX LOT#: ABNORMAL NUMERIC
NITRITE, URINE: NEGATIVE
OCCULT BLOOD: NEGATIVE
PH, URINE: 5.5 (ref 4.5–8)
PROTEIN (URINE DIPSTICK): NEGATIVE MG/DL
SPECIFIC GRAVITY: 1.03 (ref 1–1.03)
URINE-COLOR: YELLOW
UROBILINOGEN,SEMI-QN: 1 MG/DL (ref 0–1.9)

## 2024-08-08 PROCEDURE — 81003 URINALYSIS AUTO W/O SCOPE: CPT | Performed by: PHYSICIAN ASSISTANT

## 2024-08-08 PROCEDURE — 99243 OFF/OP CNSLTJ NEW/EST LOW 30: CPT | Performed by: PHYSICIAN ASSISTANT

## 2024-08-08 NOTE — PATIENT INSTRUCTIONS
Cystoscopy    Cystoscopy is a test that allows your doctor to look at the inside of the bladder and the urethra using a thin, lighted instrument called a cystoscope.  The cystoscope is inserted into your urethra and slowly advanced into the bladder. Cystoscopy allows your doctor to look at areas of your bladder and urethra that usually do not show up well on X-rays. Tiny surgical instruments can be inserted through the cystoscope that allow your doctor to remove samples of tissue (biopsy) or samples of urine.  Small bladder stones and some small growths can be removed during cystoscopy. This may eliminate the need for more extensive surgery.     Why It Is Done  Cystoscopy may be done to:  Find the cause of symptoms such as blood in the urine (hematuria), painful urination (dysuria), urinary incontinence, urinary frequency or hesitancy, an inability to pass urine (retention), or a sudden and overwhelming need to urinate (urgency).   Find the cause of problems of the urinary tract, such as frequent, repeated urinary tract infections or urinary tract infections that do not respond to treatment.   Look for problems in the urinary tract, such as blockage in the urethra caused by an enlarged prostate, kidney stones, or tumors.   Evaluate problems that cannot be seen on X-ray or to further investigate problems detected by ultrasound or during intravenous pyelography, such as kidney stones or tumors.   Remove tissue samples for biopsy.   Remove foreign objects.   Treat urinary tract problems. For example, cystoscopy can be done to remove urinary tract stones or growths, treat bleeding in the bladder, relieve blockages in the urethra, or treat or remove tumors.   How To Prepare  You may eat and drink normally before the procedure. You may be asked to give a urine sample at the time of your procedure. Please do not urinate before.    How It Is Done  Cystoscopy is performed by a urologist, with one or more assistants. The test  is done in a special testing room in the doctor's office.  You will need undress from the waste down, and you will be given a cloth or paper covering to use during the test. You will lie on your back on a special table. Females will have their knees bent and feet placed in stirrups. Males will lay flat on the table, legs straight. Your genital area is cleaned with an antiseptic solution, and your abdomen and thighs are covered with sterile cloths. A local anesthetic jelly will be inserted into the urethra. No needles are used.   After the anesthetic takes effect, a well-lubricated cystoscope is inserted into your urethra and slowly advanced into your bladder. If your urethra has a spot that is too narrow to allow the scope to pass, other smaller instruments are inserted first to gradually enlarge the opening.  After the cystoscope is inside your bladder, either sterile water or saline is injected through the scope to help expand your bladder and to create a clear view. Tiny instruments may be inserted through the scope to collect tissue samples for biopsy if necessary; the tissue samples then are sent to the laboratory for analysis.  The cystoscope is usually in your bladder for only 2 to 10 minutes. But the entire test may take up to 30 minutes or longer.  You will be able to get up immediately following the procedure and proceed with normal daily activities.     How It Feels  Most people report that this test is not nearly as uncomfortable as they had expected.   When local anesthetic is used, you may feel a burning sensation or an urge to urinate when the instrument is inserted and removed. Also, when your bladder is irrigated with sterile water or saline, you may feel a cool sensation, an uncomfortable fullness, and an urgent need to urinate. Try to relax during the test by taking slow, deep breaths. Also, if the test is lengthy, lying on the table can become tiring and uncomfortable.     After the test  After  the test, you may need to urinate frequently, with some burning during and after urination for a day or two. Drink lots of fluids to help minimize the burning and to prevent a urinary tract infection. You may also see a tinge of blood in the urine for 2-3 days.    You will be given an instruction sheet following your cystoscopy with post procedure instructions.     Results  Cystoscopy is a test that allows the doctor to look at the inside of the bladder and the urethra. Your doctor may be able to talk to you about some of the results right after the cystoscopy. If a biopsy is preformed, the results usually take several days to become available. You will be called promptly with results.   Thank you for the pleasure of allowing us to be involved in your care.

## 2024-08-08 NOTE — PROGRESS NOTES
[facility], [facility]    Urology Consult Note    History of Present Illness:   Patient is a 63 year old male with hx of CAD, HL, type II DM, anxiety, HTN, who presents today for consultation from Dr. Quintero's office for gross hematuria.    Patient presented to the ED 7/29/24 for gross hematuria. INR found to be elevated. CT a/p non contrast negative for any upper tract pathology, but significant bladder thickening.     No significant voiding complaints.  No hesitancy, strong urine stream  DF x 3-4  Nocturia x 0  No dysuria, gross hematuria  No hx of stones or UTI.     ~50 yrs x 1 ppd tobacco use.  No chemical exposurse  No FH of  cancers    HISTORY:  Past Medical History:    Anxiety state, unspecified    Coronary artery disease    Decorative tattoo    Depression    Diabetes mellitus (HCC)    Dizziness    Easy bruising    Fatigue    Hearing loss    HEART ATTACK    significant inferior wall MI with circumflex occlusion and a drug eluting stent placed    Hemorrhoids    High cholesterol    History of angioplasty    Hypercholesterolemia    Hyperlipidemia    Hypertension    Long term current use of anticoagulant    Other and unspecified hyperlipidemia    Prediabetes    Pulmonary embolism (HCC)    Sleep disturbance    Stented coronary artery    Unspecified essential hypertension    Wheezing      Past Surgical History:   Procedure Laterality Date    Angioplasty (coronary)  2011    Colonoscopy        Family History   Problem Relation Age of Onset    Heart Disease Father     Heart Disorder Father     Heart Disease Sister     Cancer Sister     Stroke Sister     Heart Disorder Sister     Diabetes Sister     Hypertension Sister     Heart Disease Brother     Stroke Brother     Heart Disorder Brother     Heart Disorder Mother       Social History:   Social History     Socioeconomic History    Marital status:    Occupational History    Occupation:      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   Tobacco Use    Smoking status: Every Day     Current packs/day: 1.00     Average packs/day: 1 pack/day for 45.0 years (45.0 ttl pk-yrs)     Types: Cigarettes    Smokeless tobacco: Never   Vaping Use    Vaping status: Never Used   Substance and Sexual Activity    Alcohol use: Yes     Alcohol/week: 10.0 standard drinks of alcohol     Types: 10 Cans of beer per week     Comment: occasional drinker    Drug use: Yes     Types: Cannabis     Comment: last use tonight, uses daily        Allergies  Allergies   Allergen Reactions    Penicillins      Was told as a child that is allergic to Penicillin       Review of Systems:   A 10-point review of systems was completed and is negative other than as noted above.    Physical Exam:   There were no vitals taken for this visit.    GENERAL APPEARANCE: well developed, well nourished, in no acute distress  NEUROLOGIC: no localizing neurologic signs, alert and oriented x 3, converses appropriately  HEAD: atraumatic, normocephalic  EYES: sclera non-icteric  ORAL CAVITY: mucosa moist  NECK/THYROID: no obvious masses or goiter  LUNGS: non-labored breathing  EXTREMITIES: warm, well-perfused. No clubbing, cyanosis or edema.  SKIN: no obvious rashes    Results:     Laboratory Data:  Lab Results   Component Value Date    WBC 8.9 07/29/2024    HGB 15.7 07/29/2024    .0 07/29/2024     Lab Results   Component Value Date     (L) 07/29/2024    K 4.4 07/29/2024     07/29/2024    CO2 25.0 07/29/2024    BUN 17 07/29/2024     (H) 07/29/2024    GFRAA 77 06/09/2021    AST 13 (L) 08/11/2023    ALT 29 08/11/2023    TP 8.0 08/11/2023    ALB 3.8 08/11/2023    CA 9.5 07/29/2024    MG 2.2 02/04/2017       Urinalysis Results (last three years):  Recent Labs     08/11/23  1423 07/29/24  1039 08/08/24  1454   COLORUR Yellow  --   --    CLARITY Clear Ex.Turbid*  --    SPECGRAVITY 1.014 1.013 1.030   PHURINE 5.0 5.5 5.5   PROUR Negative 30*  --    GLUUR  Negative Normal  --    KETUR Negative Negative  --    BILUR Negative Negative  --    BLOODURINE Negative 3+*  --    NITRITE Negative Negative Negative   UROBILINOGEN <2.0 Normal  --    LEUUR Negative Negative  --    WBCUR  --  6-10*  --    RBCUR  --  >10*  --    BACUR  --  1+*  --        Urine Culture Results (last three years):  No results found for: \"URINECUL\"    Imaging  CT ABDOMEN+PELVIS KIDNEYSTONE 2D RNDR(NO IV,NO ORAL)(CPT=74176)    Result Date: 7/29/2024  PROCEDURE:  CT ABDOMEN+PELVIS KIDNEYSTONE 2D RNDR(NO IV,NO ORAL)(CPT=74176)  COMPARISON:  LAYLA ACOSTA, CT ABDOMEN+PELVIS(CONTRAST ONLY)(CPT=74177), 6/09/2021, 4:32 PM.  INDICATIONS:  blood in urine since yesterday  TECHNIQUE:  Unenhanced multislice CT scanning from above the kidneys to below the urinary bladder.  2D rendering are generated on the CT scanner workstation to localize potential stones in the cranio-caudal plane.  Dose reduction techniques were used. Dose information is transmitted to the ACR (American College of Radiology) NRDR (National Radiology Data Registry) which includes the Dose Index Registry.  PATIENT STATED HISTORY: (As transcribed by Technologist)  Hematuria since yesterday.    FINDINGS:  KIDNEYS:  No hydronephrosis, nephrolithiasis or obstructing urinary calculus.  Mild perinephric stranding about both kidneys. ADRENALS:  Normal.  No mass or enlargement.  LIVER:  Unremarkable.  BILIARY:  No biliary ductal dilatation.  PANCREAS:  Unremarkable.  SPLEEN:  Normal caliber AORTA/VASCULAR:  Aortoiliac calcification.  An RETROPERITONEUM:  No enlarged adenopathy.  BOWEL/MESENTERY:  Normal caliber appendix.  Extensive colonic diverticulosis. BONES:  Stable compression deformity of T11 when compared to 6/9/2021.  PELVIC ORGANS:  Marked wall thickening involving the urinary bladder most pronounced anteriorly.  Recommend clinical correlation to exclude cystitis versus infiltrative process..  LUNG BASES:         Subsegmental atelectasis in  the lung bases            CONCLUSION:  Marked circumferential wall thickening of the urinary bladder.  This may be seen with  cystitis although recommend clinical correlation to exclude bladder infiltrative process.  No nephrolithiasis, hydronephrosis or obstructing urinary calculus.  Extensive colonic diverticulosis.  Stable T11 compression deformity.   LOCATION:  [id number]   Dictated by (CST): Lia York MD on 7/29/2024 at 10:59 AM     Finalized by (CST): Lia York MD on 7/29/2024 at 11:03 AM           Impression:     Patient is a 63 year old male with hx of CAD, HL, type II DM, anxiety, HTN, who presents today for consultation from Dr. Quintero's office for gross hematuria.    Reviewed with patient potential etiologies of gross hematuria. We reviewed his CT scan images and the significant bladder wall thickening. Recommend he proceed with cystoscopy at this time for further evaluation of bladder. Consideration for future CTU.    Recommendations:  Return for cystoscopy. Send urine for cytology.     Thank you very much for this consult. Please call if there are any questions or concerns.     Michelle Orta PA-C  Urology  [facility]    Date: 8/8/2024

## 2024-08-09 LAB — NON GYNE INTERPRETATION: NEGATIVE

## 2024-09-05 ENCOUNTER — PROCEDURE (OUTPATIENT)
Dept: SURGERY | Facility: CLINIC | Age: 64
End: 2024-09-05
Payer: COMMERCIAL

## 2024-09-05 DIAGNOSIS — R82.90 URINE FINDING: Primary | ICD-10-CM

## 2024-09-05 DIAGNOSIS — R31.0 GROSS HEMATURIA: ICD-10-CM

## 2024-09-05 LAB
APPEARANCE: CLEAR
BILIRUBIN: NEGATIVE
GLUCOSE (URINE DIPSTICK): >=1000 MG/DL
LEUKOCYTES: NEGATIVE
MULTISTIX LOT#: ABNORMAL NUMERIC
NITRITE, URINE: NEGATIVE
OCCULT BLOOD: NEGATIVE
PH, URINE: 5 (ref 4.5–8)
PROTEIN (URINE DIPSTICK): NEGATIVE MG/DL
SPECIFIC GRAVITY: 1.02 (ref 1–1.03)
URINE-COLOR: YELLOW
UROBILINOGEN,SEMI-QN: 0.2 MG/DL (ref 0–1.9)

## 2024-09-05 PROCEDURE — 81003 URINALYSIS AUTO W/O SCOPE: CPT | Performed by: UROLOGY

## 2024-09-05 PROCEDURE — 52000 CYSTOURETHROSCOPY: CPT | Performed by: UROLOGY

## 2024-09-05 NOTE — PROGRESS NOTES
Clinic Procedure Note    INDICATIONS:       Patient is a 63 year old male with hx of CAD, HL, type II DM, anxiety, HTN, who presents today for consultation from Dr. Quintero's office for gross hematuria.     Patient presented to the ED 24 for gross hematuria. INR found to be elevated. CT a/p non contrast negative for any upper tract pathology, but significant bladder thickening.      No significant voiding complaints.  No hesitancy, strong urine stream  DF x 3-4  Nocturia x 0  No dysuria, gross hematuria  No hx of stones or UTI.   Voided cytology was negative.      ~50 yrs x 1 ppd tobacco use.  No chemical exposurse  No FH of  cancers  Saw PA-C for the above. Decision was made to proceed with cystoscopy; hold off on urogram unless abnormalities on cystoscopy.       PROCEDURE:       1. Flexible cystourethroscopy    DATE OF PROCEDURE: 2024     PRE-PROCEDURE DIAGNOSIS: Gross hematuria    POST-PROCEDURE DIAGNOSIS: Same     SURGEON: Derek Silva MD    FINDINGS:    Urethra: Orthotopic meatus, normal caliber urethra throughout without lesions    Prostate: Moderately enlarged without signs of obstruction, mildly high bladder neck, minimal intravesical protrusion    Bladder: Normal mucosa with no papillary lesions or erythema, mild trabeculation    Ureteral orifices: Orthotopic    Other findings: None    PROCEDURE:   Patient was brought to the procedure suite and a time-out was performed identifiying the patient,  and procedure to be performed. The risks and benefits of the procedure were once again discussed with the patient including bleeding, infection, and dysuria. The patient agreed to proceed. The patient did not have any signs or symptoms of active UTI.    He was placed in supine position on the table and the penis was prepped and draped in the standard sterile fashion. Urojet was instilled per urethra for local anesthetic effect. A flexible cystoscope was inserted per urethra. The bladder was fully  inspected (including retroflexion) and showed findings as above. Both ureteral orifices were orthotopic. A bladder barbotage was obtained.   The prostate was carefully viewed on removal of the scope, with findings as above. The scope was then carefully removed.    There were no complications and the patient tolerated the procedure well.    IMPRESSION AND PLAN:    Gross hematuria  Cysto as above; likely from anticoagulation/BPH. Has resolved fully. Discussed urogram to complete workup given previous non contrast scan. He would like to do this. Order placed.   Fu cytology.      RTC6mo with Abundio Silva MD  Staff Urologist  [facility]  Office: [phone]

## 2025-08-18 ENCOUNTER — HOSPITAL ENCOUNTER (OUTPATIENT)
Dept: GENERAL RADIOLOGY | Facility: HOSPITAL | Age: 65
Discharge: HOME | End: 2025-08-18
Attending: INTERNAL MEDICINE

## 2025-08-18 DIAGNOSIS — R07.9 ACUTE CHEST PAIN: ICD-10-CM

## 2025-08-18 PROCEDURE — 71046 X-RAY EXAM CHEST 2 VIEWS: CPT | Performed by: INTERNAL MEDICINE

## (undated) NOTE — LETTER
Patient Name: Belkis Bey  YOB: 1960          MRN number:  [id number]  Date:  1/16/2020  Referring Physician:  Dr. Lázaro Villagran EVALUATION:    Referring Physician: Dr. Monico Fuller  Diagnosis:   L shoulder dysfunc ability to use arm. The results of the objective tests and measures show inability to raise arm overhead, out to side or behind back due to impingement pain and tissue restrictions. Loss of all PROM due to impingement pain and tissue restrictions.  Has lo Patient was instructed in, performed and issued a HEP for seated L shoulder ABD/ER and ABD/IR AROM with UE supported followed by CKC UE flexion slides against wall in neutral position x 5 reps. HO issued for HEP.      Charges: PT Eval Low Complexity, man 1 at Dept: [phone]    Sincerely,  Electronically signed by therapist: Jeny Proctor PT

## (undated) NOTE — IP AVS SNAPSHOT
[facility] [place] [address] [place] [address], [address] ~ [phone]                Discharge Summary   2/3/2017    Lynda Apodaca           Admission Information        Provider Department    2/3/2017 Kris Lao MD  2ne-A Next dose due:  TOMORROW        Take 1 tablet (325 mg total) by mouth daily. Carter Gupta                           Metoprolol Succinate ER 25 MG Tb24   Last time this was given:  25 mg on 2/7/2017  7:01 AM   Commonly known as:   Toprol XL   What changed: --> Keep a log and review with Dr. Vira Hay  ---------------------------------------------------------------------------------------------------------------------------------------------------------------------    CT [address] [facility] CARDIAC:  Coronary artery calcifications. CHEST WALL:  Normal.  LIMITED ABDOMEN:  Hepatic steatosis. BONES:  Mild degenerative disc disease.      =====  CONCLUSION:     #1. No acute pulmonary embolus to the segmental level. #2. Emphysematous changes.   # 15.8 (02/07/17)  45.5 (02/07/17)  91.9 (02/07/17)  31.9 (02/07/17)  34.7 -- (02/07/17)  148.0 (L) --    (02/06/17)  7.8 (02/06/17)  5.03 (02/06/17)  16.1 (02/06/17)  46.4 (02/06/17)  92.2 (02/06/17)  32.0 (02/06/17)  34.7  (02/06/17)  153.0     (02/04/17) Patient Belongings       Most Recent Value    All belongings returned to patient at discharge Pt's bedside belongings    Medications Sent Home None to return    Medications Returned:           Additional Information       We are concerned for your overall w Blood Pressure and Cardiac Medications     Metoprolol Succinate ER 25 MG Oral Tablet 24 Hr         Use: Treat abnormal blood pressure (high or low), cardiac conditions; and/or abnormal heart rates/rhythms   Most common side effects: Dizziness or feeling li Mood and Thought Medications     Sertraline HCl (ZOLOFT) 100 MG Oral Tab       Use: Treat conditions such as depression and thought disorders   Most common side effects: Dizziness, drowsiness, problems with movement   What to report to your healthcare team

## (undated) NOTE — ED AVS SNAPSHOT
[facility] Emergency Department  [address] [address]  Phone:  [phone]  Fax:  [phone]          Jus Lambscott   MRN: [id number]    Department:  [facility] Emergency Department   Date of Visit:  7/15/2017 IF THERE IS ANY CHANGE OR WORSENING OF YOUR CONDITION, CALL YOUR PRIMARY CARE PHYSICIAN AT ONCE OR RETURN IMMEDIATELY TO THE EMERGENCY DEPARTMENT.     If you have been prescribed any medication(s), please fill your prescription right away and begin taking t

## (undated) NOTE — ED AVS SNAPSHOT
Willam Ferrell   MRN: [id number]    Department:  [facility] Emergency Department   Date of Visit:  2/4/2019           Disclosure     Insurance plans vary and the physician(s) referred by the ER may not be covered by your plan.  Please contact yo tell this physician (or your personal doctor if your instructions are to return to your personal doctor) about any new or lasting problems. The primary care or specialist physician will see patients referred from the [facility] Emergency Department.  Arthor Bernheim